# Patient Record
Sex: FEMALE | Race: OTHER
[De-identification: names, ages, dates, MRNs, and addresses within clinical notes are randomized per-mention and may not be internally consistent; named-entity substitution may affect disease eponyms.]

---

## 2017-01-25 ENCOUNTER — HOSPITAL ENCOUNTER (OUTPATIENT)
Dept: HOSPITAL 62 - OD | Age: 72
End: 2017-01-25
Attending: INTERNAL MEDICINE
Payer: MEDICARE

## 2017-01-25 ENCOUNTER — HOSPITAL ENCOUNTER (OUTPATIENT)
Dept: HOSPITAL 62 - WI | Age: 72
End: 2017-01-25
Attending: INTERNAL MEDICINE
Payer: MEDICARE

## 2017-01-25 DIAGNOSIS — M81.0: Primary | ICD-10-CM

## 2017-01-25 DIAGNOSIS — M25.512: Primary | ICD-10-CM

## 2017-01-25 PROCEDURE — 77080 DXA BONE DENSITY AXIAL: CPT

## 2017-01-25 NOTE — WOMENS IMAGING REPORT
EXAM DESCRIPTION:  BONE DENSITY HIP/SPINE



COMPLETED DATE/TIME:  1/25/2017 1:08 pm



REASON FOR STUDY:  M81.0 OSTEO M81.0  AGE-RELATED OSTEOPOROSIS W/O CURRENT PATHOLOGICAL FRAC



COMPARISON:   PET-CT 7/17/2016

Bone density 11/17/2014



TECHNIQUE:  Dual-Energy X-ray Absorptiometry (DEXA) of the AP Spine and Hip.



LIMITATIONS:  None.



FINDINGS:  LUMBAR SPINE:

The bone mineral density (BMD) measured from L1-L4 in the AP projection correlates with a T-score of 
-3.0, which is osteoporotic as defined by the World Health Organization.  This represents a 10% incre
ase in bone density compared to 2014

HIP:

The bone mineral density (BMD) measured in the left total hip correlates with a T-score of -2.4, whic
h is borderline osteoporotic as defined by the World Health Organization.  This represents a 10% incr
ease in bone density compared to 2014



COMMENT:  The World Health Organization defines low BMD as follows:

T-score:

Normal:  Greater than -1.0

Osteopenia: Between -1.0 and -2.5

Osteoporosis:  Less than -2.5 without fractures

Established osteoporosis:  Less than -2.5 with fractures

In general, you may wish to consider:

Diagnosis          Treatment                     Follow-up DEXA

Normal BMD      Prevention                    2-3 years

Osteopenia       Prevention/Therapy        1-2 years

Osteoporosis     Therapy                        Yearly



TECHNICAL DOCUMENTATION:  JOB ID:  [id number]

 [employer]- All Rights Reserved

## 2017-01-29 ENCOUNTER — HOSPITAL ENCOUNTER (OUTPATIENT)
Dept: HOSPITAL 62 - RAD | Age: 72
End: 2017-01-29
Attending: INTERNAL MEDICINE
Payer: MEDICARE

## 2017-01-29 DIAGNOSIS — C34.32: Primary | ICD-10-CM

## 2017-01-29 PROCEDURE — A9552 F18 FDG: HCPCS

## 2017-01-29 PROCEDURE — 78815 PET IMAGE W/CT SKULL-THIGH: CPT

## 2017-04-23 ENCOUNTER — HOSPITAL ENCOUNTER (OUTPATIENT)
Dept: HOSPITAL 62 - RAD | Age: 72
End: 2017-04-23
Attending: INTERNAL MEDICINE
Payer: MEDICARE

## 2017-04-23 DIAGNOSIS — C34.32: Primary | ICD-10-CM

## 2017-04-23 PROCEDURE — 78815 PET IMAGE W/CT SKULL-THIGH: CPT

## 2017-04-23 PROCEDURE — A9552 F18 FDG: HCPCS

## 2017-06-02 ENCOUNTER — HOSPITAL ENCOUNTER (OUTPATIENT)
Dept: HOSPITAL 62 - LAB | Age: 72
End: 2017-06-02
Attending: FAMILY MEDICINE
Payer: MEDICARE

## 2017-06-02 DIAGNOSIS — E87.1: Primary | ICD-10-CM

## 2017-06-02 LAB
ANION GAP SERPL CALC-SCNC: 10 MMOL/L (ref 5–19)
BUN SERPL-MCNC: 12 MG/DL (ref 7–20)
CALCIUM: 9.4 MG/DL (ref 8.4–10.2)
CHLORIDE SERPL-SCNC: 98 MMOL/L (ref 98–107)
CO2 SERPL-SCNC: 22 MMOL/L (ref 22–30)
CREAT SERPL-MCNC: 0.97 MG/DL (ref 0.52–1.25)
GLUCOSE SERPL-MCNC: 97 MG/DL (ref 75–110)
POTASSIUM SERPL-SCNC: 5 MMOL/L (ref 3.6–5)
SODIUM SERPL-SCNC: 129.5 MMOL/L (ref 137–145)

## 2017-06-02 PROCEDURE — 36415 COLL VENOUS BLD VENIPUNCTURE: CPT

## 2017-06-02 PROCEDURE — 84300 ASSAY OF URINE SODIUM: CPT

## 2017-06-02 PROCEDURE — 80048 BASIC METABOLIC PNL TOTAL CA: CPT

## 2017-06-02 PROCEDURE — 83930 ASSAY OF BLOOD OSMOLALITY: CPT

## 2017-06-02 PROCEDURE — 83935 ASSAY OF URINE OSMOLALITY: CPT

## 2017-06-05 ENCOUNTER — HOSPITAL ENCOUNTER (EMERGENCY)
Dept: HOSPITAL 62 - ER | Age: 72
Discharge: HOME | End: 2017-06-05
Payer: MEDICARE

## 2017-06-05 VITALS — SYSTOLIC BLOOD PRESSURE: 143 MMHG | DIASTOLIC BLOOD PRESSURE: 64 MMHG

## 2017-06-05 DIAGNOSIS — Z88.0: ICD-10-CM

## 2017-06-05 DIAGNOSIS — R05: ICD-10-CM

## 2017-06-05 DIAGNOSIS — R50.9: Primary | ICD-10-CM

## 2017-06-05 DIAGNOSIS — K13.79: ICD-10-CM

## 2017-06-05 DIAGNOSIS — Z79.899: ICD-10-CM

## 2017-06-05 DIAGNOSIS — I10: ICD-10-CM

## 2017-06-05 DIAGNOSIS — Z88.2: ICD-10-CM

## 2017-06-05 DIAGNOSIS — Z85.118: ICD-10-CM

## 2017-06-05 DIAGNOSIS — R59.9: ICD-10-CM

## 2017-06-05 DIAGNOSIS — E87.1: ICD-10-CM

## 2017-06-05 LAB
ALBUMIN SERPL-MCNC: 3.7 G/DL (ref 3.5–5)
ALP SERPL-CCNC: 85 U/L (ref 38–126)
ALT SERPL-CCNC: 30 U/L (ref 9–52)
ANION GAP SERPL CALC-SCNC: 9 MMOL/L (ref 5–19)
APPEARANCE UR: CLEAR
AST SERPL-CCNC: 28 U/L (ref 14–36)
BASE EXCESS BLDV CALC-SCNC: -1.7 MMOL/L
BASOPHILS # BLD AUTO: 0.1 10^3/UL (ref 0–0.2)
BASOPHILS NFR BLD AUTO: 1.2 % (ref 0–2)
BILIRUB DIRECT SERPL-MCNC: 0.4 MG/DL (ref 0–0.4)
BILIRUB SERPL-MCNC: 0.6 MG/DL (ref 0.2–1.3)
BILIRUB UR QL STRIP: NEGATIVE
BUN SERPL-MCNC: 12 MG/DL (ref 7–20)
CALCIUM: 9.4 MG/DL (ref 8.4–10.2)
CHLORIDE SERPL-SCNC: 95 MMOL/L (ref 98–107)
CO2 SERPL-SCNC: 20 MMOL/L (ref 22–30)
CREAT SERPL-MCNC: 0.99 MG/DL (ref 0.52–1.25)
EOSINOPHIL # BLD AUTO: 0.1 10^3/UL (ref 0–0.6)
EOSINOPHIL NFR BLD AUTO: 0.9 % (ref 0–6)
ERYTHROCYTE [DISTWIDTH] IN BLOOD BY AUTOMATED COUNT: 13.8 % (ref 11.5–14)
GLUCOSE SERPL-MCNC: 99 MG/DL (ref 75–110)
GLUCOSE UR STRIP-MCNC: NEGATIVE MG/DL
HCO3 BLDV-SCNC: 22 MMOL/L (ref 20–32)
HCT VFR BLD CALC: 31.8 % (ref 36–47)
HGB BLD-MCNC: 10.3 G/DL (ref 12–15.5)
HGB HCT DIFFERENCE: -0.9
KETONES UR STRIP-MCNC: NEGATIVE MG/DL
LYMPHOCYTES # BLD AUTO: 1.4 10^3/UL (ref 0.5–4.7)
LYMPHOCYTES NFR BLD AUTO: 20.1 % (ref 13–45)
MCH RBC QN AUTO: 29.1 PG (ref 27–33.4)
MCHC RBC AUTO-ENTMCNC: 32.4 G/DL (ref 32–36)
MCV RBC AUTO: 90 FL (ref 80–97)
MONOCYTES # BLD AUTO: 0.9 10^3/UL (ref 0.1–1.4)
MONOCYTES NFR BLD AUTO: 12.4 % (ref 3–13)
NEUTROPHILS # BLD AUTO: 4.6 10^3/UL (ref 1.7–8.2)
NEUTS SEG NFR BLD AUTO: 65.4 % (ref 42–78)
NITRITE UR QL STRIP: NEGATIVE
PCO2 BLDV: 33.4 MMHG (ref 35–63)
PH BLDV: 7.44 [PH] (ref 7.3–7.42)
PH UR STRIP: 6 [PH] (ref 5–9)
POTASSIUM SERPL-SCNC: 4.9 MMOL/L (ref 3.6–5)
PROT SERPL-MCNC: 6.8 G/DL (ref 6.3–8.2)
PROT UR STRIP-MCNC: NEGATIVE MG/DL
PROTHROMBIN TIME: 12.8 SEC (ref 11.4–15.4)
RBC # BLD AUTO: 3.55 10^6/UL (ref 3.72–5.28)
SODIUM SERPL-SCNC: 124.3 MMOL/L (ref 137–145)
SP GR UR STRIP: 1.01
T3FREE SERPL-MCNC: 3.08 PG/ML (ref 2.77–5.27)
TSH SERPL-ACNC: 5.73 UIU/ML (ref 0.47–4.68)
UROBILINOGEN UR-MCNC: NEGATIVE MG/DL (ref ?–2)
WBC # BLD AUTO: 7.1 10^3/UL (ref 4–10.5)

## 2017-06-05 PROCEDURE — 87086 URINE CULTURE/COLONY COUNT: CPT

## 2017-06-05 PROCEDURE — 93010 ELECTROCARDIOGRAM REPORT: CPT

## 2017-06-05 PROCEDURE — 87040 BLOOD CULTURE FOR BACTERIA: CPT

## 2017-06-05 PROCEDURE — 84443 ASSAY THYROID STIM HORMONE: CPT

## 2017-06-05 PROCEDURE — 83605 ASSAY OF LACTIC ACID: CPT

## 2017-06-05 PROCEDURE — 81001 URINALYSIS AUTO W/SCOPE: CPT

## 2017-06-05 PROCEDURE — 84439 ASSAY OF FREE THYROXINE: CPT

## 2017-06-05 PROCEDURE — 80053 COMPREHEN METABOLIC PANEL: CPT

## 2017-06-05 PROCEDURE — 93005 ELECTROCARDIOGRAM TRACING: CPT

## 2017-06-05 PROCEDURE — 85025 COMPLETE CBC W/AUTO DIFF WBC: CPT

## 2017-06-05 PROCEDURE — 82803 BLOOD GASES ANY COMBINATION: CPT

## 2017-06-05 PROCEDURE — 85610 PROTHROMBIN TIME: CPT

## 2017-06-05 PROCEDURE — 84481 FREE ASSAY (FT-3): CPT

## 2017-06-05 PROCEDURE — 99284 EMERGENCY DEPT VISIT MOD MDM: CPT

## 2017-06-05 PROCEDURE — 36415 COLL VENOUS BLD VENIPUNCTURE: CPT

## 2017-06-05 PROCEDURE — 71020: CPT

## 2017-06-05 NOTE — ER DOCUMENT REPORT
ED General





- General


Stated Complaint: WEAKNESS


Time Seen by Provider: 06/05/17 18:48


Mode of Arrival: Ambulatory


Information source: Patient, Relative


Notes: 


71-year-old female who is currently on her eighth round of immunotherapy for 

enlarged lymph node with a history of lung CA presents with complaint of fever.

  Patient denies any complaints at all.  Patient's oncologist requested lab work


TRAVEL OUTSIDE OF THE [place] IN LAST 30 DAYS: No





- HPI


Onset: Yesterday


Onset/Duration: Sudden


Quality of pain: No pain


Severity: None


Pain Level: Denies


Associated symptoms: Fever


Exacerbated by: Denies


Relieved by: Denies


Similar symptoms previously: Yes


Recently seen / treated by doctor: Yes





- Related Data


Allergies/Adverse Reactions: 


 





Penicillins Allergy (Unknown, Verified 10/18/13 12:21)


 


Sulfa (Sulfonamide Antibiotics) Allergy (Unknown, Verified 10/18/13 12:21)


 











Past Medical History





- Social History


Smoking Status: Never Smoker


Cigarette use (# per day): No


Chew tobacco use (# tins/day): No


Smoking Education Provided: No


Family History: Reviewed & Not Pertinent





- Past Medical History


Cardiac Medical History: Reports: Hx Hypertension


   Denies: Hx Heart Attack


Pulmonary Medical History: 


   Denies: Hx Asthma, Hx Tuberculosis


Neurological Medical History: Denies: Hx Cerebrovascular Accident, Hx Seizures


GI Medical History: Denies: Hx Hepatitis, Hx Hiatal Hernia, Hx Ulcer


Infectious Medical History: Denies: Hx Hepatitis


Past Surgical History: Denies: Hx Mastectomy, Hx Open Heart Surgery, Hx 

Pacemaker





- Immunizations


Hx Diphtheria, Pertussis, Tetanus Vaccination: No





Review of Systems





- Review of Systems


Notes: 


REVIEW OF SYSTEMS:


CONSTITUTIONAL : admit to fever


EENT:   Mouth pain


CARDIOVASCULAR:  Denies chest pain.  Denies palpitations or racing or irregular 

heart beat.  Denies ankle edema.


RESPIRATORY:  Denies cough, cold, or chest congestion.  Denies shortness of 

breath, difficulty breathing, or wheezing.


GASTROINTESTINAL:  Denies abdominal pain or distention.  Denies nausea, vomiting

, or diarrhea.  Denies blood in vomitus, stools, or per rectum.  Denies black, 

tarry stools.  Denies constipation. 


GENITOURINARY:  Denies difficulty urinating, painful urination, burning, 

frequency, blood in urine, or discharge.


FEMALE  GENITOURINARY:  Denies vaginal bleeding, heavy or abnormal periods, 

irregular periods.  Denies vaginal discharge or odor. 


MUSCULOSKELETAL:  Denies back or neck pain or stiffness.  Denies joint pain or 

swelling.


SKIN:   Denies rash, lesions or sores.


HEMATOLOGIC :   Denies easy bruising or bleeding.


LYMPHATIC:  Denies swollen, enlarged glands.


NEUROLOGICAL:  Denies confusion or altered mental status.  Denies passing out 

or loss of consciousness.  Denies dizziness or lightheadedness.  Denies 

headache.  Denies weakness or paralysis or loss of use of either side.  Denies 

problems with gait or speech.  Denies sensory loss, numbness, or tingling.  

Denies seizures.


PSYCHIATRIC:  Denies anxiety or stress.  Denies depression, suicidal ideation, 

or homicidal ideation.





ALL OTHER SYSTEMS REVIEWED AND NEGATIVE.








Dictation was performed using Dragon voice recognition software








PHYSICAL EXAMINATION:





GENERAL: Well-appearing, well-nourished and in no acute distress.





HEAD: Atraumatic, normocephalic.





EYES: Pupils equal round and reactive to light, extraocular movements intact, 

conjunctiva are normal.





ENT: Nares patent, oropharynx clear without exudates.  Moist mucous membranes.  

Sores on tongue





NECK: Normal range of motion, supple without lymphadenopathy





LUNGS: Breath sounds clear to auscultation bilaterally and equal.  No wheezes 

rales or rhonchi.





HEART: Regular rate and rhythm without murmurs





ABDOMEN: Soft, nontender, nondistended abdomen.  No guarding, no rebound.  No 

masses appreciated.





Female : deferred





Musculoskeletal: Normal range of motion, no pitting or edema.  No cyanosis.





NEUROLOGICAL: Cranial nerves grossly intact.  Normal speech, normal gait.  

Normal sensory, motor exams





PSYCH: Normal mood, normal affect.





SKIN: Warm, Dry, normal turgor, no rashes or lesions noted.





Physical Exam





- Vital signs


Vitals: 


 











Temp


 


 98.8 F 


 


 06/05/17 20:29














Course





- Re-evaluation


Re-evalutation: 


06/05/17 18:50


Labwork is pending patient otherwise she looks extremely well given history of 

immuno compromised state cultures are pending





06/05/17 20:56


Mild hyponatremia is noted, patient otherwise looks well, I will defer on any 

further treatment at family request.


have no suspicion for sepsis











After performing a Medical Screening Examination, I estimate there is LOW risk 

for ACUTE CORONARY SYNDROME, RESPIRATORY FAILURE, SEPSIS OR MENINGITIS, thus I 

consider the discharge disposition reasonable.  I have reevaluated this patient 

multiple times and no significant life threatening changes are noted. The 

patient son and I have discussed the diagnosis and risks, and we agree with 

discharging home with close follow-up. We also discussed returning to the 

Emergency Department immediately if new or worsening symptoms occur. We have 

discussed the symptoms which are most concerning (e.g., changing or worsening 

pain, trouble swallowing or breathing, neck stiffness, fever) that necessitate 

immediate return.





- Vital Signs


Vital signs: 


 











Temp Pulse Resp BP Pulse Ox


 


 98.8 F   83   18   149/60 H  100 


 


 06/05/17 20:30  06/05/17 20:31  06/05/17 20:31  06/05/17 20:31  06/05/17 20:31














- Laboratory


Result Diagrams: 


 06/05/17 19:55





 06/05/17 20:05


Laboratory results interpreted by me: 


 











  06/05/17 06/05/17 06/05/17





  19:05 19:55 20:05


 


RBC   3.55 L 


 


Hgb   10.3 L 


 


Hct   31.8 L 


 


VBG pH   


 


VBG pCO2   


 


Sodium    124.3 L


 


Chloride    95 L


 


Carbon Dioxide    20 L


 


Est GFR (Non-Af Amer)    55 L


 


Urine Ascorbic Acid  40 H  














  06/05/17





  20:05


 


RBC 


 


Hgb 


 


Hct 


 


VBG pH  7.44 H


 


VBG pCO2  33.4 L


 


Sodium 


 


Chloride 


 


Carbon Dioxide 


 


Est GFR (Non-Af Amer) 


 


Urine Ascorbic Acid 














- Diagnostic Test


Radiology reviewed: Image reviewed, Reports reviewed - questionble infiltrate





- EKG Interpretation by Me


EKG shows normal: Sinus rhythm, Axis, Intervals, QRS Complexes





Discharge





- Discharge


Clinical Impression: 


 Cough, Hyponatremia





Fever


Qualifiers:


 Fever type: unspecified Qualified Code(s): R50.9 - Fever, unspecified





Condition: Stable


Disposition: HOME, SELF-CARE


Instructions:  Fever ([facility])


Referrals: 


KUNAL BYRD MD [Primary Care Provider] - Follow up tomorrow

## 2017-06-05 NOTE — RADIOLOGY REPORT (SQ)
EXAM DESCRIPTION:  CHEST PA/LAT



COMPLETED DATE/TIME:  6/5/2017 7:10 pm



REASON FOR STUDY:  fever



COMPARISON:  PET scan dated April 2017



EXAM PARAMETERS:  NUMBER OF VIEWS: two views

TECHNIQUE: Digital Frontal and Lateral radiographic views of the chest acquired.

RADIATION DOSE: NA

LIMITATIONS: none



FINDINGS:  LUNGS AND PLEURA: There are patchy pleural and parenchymal densities in the left mid and l
ower lung field which appear improved as compared to the previous PET scan.  These could represent re
sidual changes related to the mass lesions described on the previous study.  The possibility of an ac
[place] process cannot be excluded.  There is some ill-defined increased density in the right lung apex w
hich could represent a developing infiltrate.

MEDIASTINUM AND HILAR STRUCTURES: Left hilum is partially obscured due to overlying densities.

HEART AND VASCULAR STRUCTURES: Heart normal size.  No evidence for failure.

BONES: No acute findings.

HARDWARE: Surgical clips are identified in left lower hemithorax and in the right lung apex.

OTHER: No other significant finding.



IMPRESSION:  There are patchy pleuroparenchymal changes in the left mid and lower lung field as noted
 above which appear improved as compared to the previous PET-CT scan.  This could represent residual 
changes related to the mass lesions described on the previous study.  The possibility of an acute pro
cess cannot be excluded.  There is some ill-defined increased density in the right lung apex which co
uld represent a developing infiltrate.  Other findings as noted above



TECHNICAL DOCUMENTATION:  JOB ID:  [id number]

 [employer]- All Rights Reserved

## 2017-06-05 NOTE — EKG REPORT
SEVERITY:- BORDERLINE ECG -

SINUS ARRHYTHMIA, RATE 70-94

PROBABLE LEFT ATRIAL ABNORMALITY

LOW VOLTAGE IN FRONTAL LEADS

:

Confirmed by: Pushpa Echeverria 05-Jun-2017 21:15:03

## 2017-06-07 ENCOUNTER — HOSPITAL ENCOUNTER (OUTPATIENT)
Dept: HOSPITAL 62 - LAB | Age: 72
End: 2017-06-07
Attending: PHYSICIAN ASSISTANT
Payer: MEDICARE

## 2017-06-07 DIAGNOSIS — E87.1: Primary | ICD-10-CM

## 2017-06-07 LAB
ANION GAP SERPL CALC-SCNC: 10 MMOL/L (ref 5–19)
BUN SERPL-MCNC: 11 MG/DL (ref 7–20)
CALCIUM: 9.4 MG/DL (ref 8.4–10.2)
CHLORIDE SERPL-SCNC: 96 MMOL/L (ref 98–107)
CO2 SERPL-SCNC: 23 MMOL/L (ref 22–30)
CREAT SERPL-MCNC: 1.09 MG/DL (ref 0.52–1.25)
GLUCOSE SERPL-MCNC: 102 MG/DL (ref 75–110)
POTASSIUM SERPL-SCNC: 4.9 MMOL/L (ref 3.6–5)
SODIUM SERPL-SCNC: 128.9 MMOL/L (ref 137–145)

## 2017-06-07 PROCEDURE — 36415 COLL VENOUS BLD VENIPUNCTURE: CPT

## 2017-06-07 PROCEDURE — 80048 BASIC METABOLIC PNL TOTAL CA: CPT

## 2017-06-12 ENCOUNTER — HOSPITAL ENCOUNTER (OUTPATIENT)
Dept: HOSPITAL 62 - LAB | Age: 72
End: 2017-06-12
Attending: PHYSICIAN ASSISTANT
Payer: MEDICARE

## 2017-06-12 DIAGNOSIS — E87.1: Primary | ICD-10-CM

## 2017-06-12 LAB
ANION GAP SERPL CALC-SCNC: 13 MMOL/L (ref 5–19)
BUN SERPL-MCNC: 10 MG/DL (ref 7–20)
CALCIUM: 8.7 MG/DL (ref 8.4–10.2)
CHLORIDE SERPL-SCNC: 100 MMOL/L (ref 98–107)
CO2 SERPL-SCNC: 20 MMOL/L (ref 22–30)
CREAT SERPL-MCNC: 0.94 MG/DL (ref 0.52–1.25)
GLUCOSE SERPL-MCNC: 92 MG/DL (ref 75–110)
POTASSIUM SERPL-SCNC: 4.7 MMOL/L (ref 3.6–5)
SODIUM SERPL-SCNC: 132.5 MMOL/L (ref 137–145)

## 2017-06-12 PROCEDURE — 36415 COLL VENOUS BLD VENIPUNCTURE: CPT

## 2017-06-12 PROCEDURE — 80048 BASIC METABOLIC PNL TOTAL CA: CPT

## 2017-07-11 ENCOUNTER — HOSPITAL ENCOUNTER (OUTPATIENT)
Dept: HOSPITAL 62 - II | Age: 72
Discharge: HOME | End: 2017-07-11
Attending: INTERNAL MEDICINE
Payer: MEDICARE

## 2017-07-11 VITALS — SYSTOLIC BLOOD PRESSURE: 140 MMHG | DIASTOLIC BLOOD PRESSURE: 47 MMHG

## 2017-07-11 DIAGNOSIS — C34.32: Primary | ICD-10-CM

## 2017-07-11 PROCEDURE — 3E033GC INTRODUCTION OF OTHER THERAPEUTIC SUBSTANCE INTO PERIPHERAL VEIN, PERCUTANEOUS APPROACH: ICD-10-PCS | Performed by: INTERNAL MEDICINE

## 2017-07-11 PROCEDURE — 96365 THER/PROPH/DIAG IV INF INIT: CPT

## 2017-07-30 ENCOUNTER — HOSPITAL ENCOUNTER (OUTPATIENT)
Dept: HOSPITAL 62 - RAD | Age: 72
End: 2017-07-30
Attending: INTERNAL MEDICINE
Payer: MEDICARE

## 2017-07-30 DIAGNOSIS — C34.32: Primary | ICD-10-CM

## 2017-07-30 PROCEDURE — A9552 F18 FDG: HCPCS

## 2017-07-30 PROCEDURE — 78815 PET IMAGE W/CT SKULL-THIGH: CPT

## 2017-07-31 NOTE — RADIOLOGY REPORT (SQ)
EXAM DESCRIPTION:  PET CT SKULL/THIGH



COMPLETED DATE/TIME:  7/30/2017 7:13 pm



REASON FOR STUDY:  LUNG CANCER C34.32  MALIGNANT NEOPLASM OF LOWER LOBE, LEFT BRONCHUS OR TJ



COMPARISON:  4/23/2017 and 1/29/2017.



RADIONUCLIDE AND DOSE:  12.0 mCi F18 FDG

The route of agent administration: Intravenous



FASTING BLOOD SUGAR:  74 mg/dl



CONTRAST TYPE AND DOSE:  No CT contrast given.



TECHNIQUE:  Blood glucose level was verified.  Above dose of FDG was injected intravenously.  2-D seg
mented attenuation correction images were obtained from the base of the skull to the midthighs.  Nonc
ontrast CT images were obtained for attenuation correction and fusion with emission images.  CT image
s were performed without oral or intravenous contrast and are not sensitive for parenchymal lesions. 
 A series of overlapping emission PET images were obtained.  Images reviewed and manipulated at [facility] work station by the radiologist.  Images stored on PACS.



LIMITATIONS:  None.



FINDINGS:  HEAD AND NECK: There is a new hypermetabolic cervical lymph node on the left side.  This m
easures 7 mm (image 28) with mean SUV value 3.38 and maximum value 4.65.  Additional smaller subcenti
meter lymph nodes bilaterally at roughly the same level with mean SUV value on the right 2.92 and on 
the left 3.36.  There is also a focal area of increased activity on the right side of the thyroid.  O
n CT images difficult to determine if this is the thyroid or could be a small adjacent lymph node.  M
taj SUV value 3.21.

CHEST: The mass in the left lower lobe is smaller, currently measuring 2.4 x 3.3 cm with prior measur
ement of 3.1 x 3.5 cm.  Mean SUV value 3.21 with prior value of 3.  Subcarinal lymph node measures 0.
8 x 1.2 cm with prior measurement of 1.5 x 1.7 cm.  Mean SUV value 4.52 with prior value of 6.5.  The
re are small bilateral hilar lymph nodes, subcentimeter, with mean SUV value on the right 3.04 and on
 the left 3.50.  Previously seen right supraclavicular lymph node is no longer present.  There are ra
diotherapy markers at this level.  Previously seen right paratracheal lymph node with no measurable m
etabolic activity.

ABDOMEN AND PELVIS: No areas of abnormal metabolic activity in the abdomen or pelvis.  Expected physi
ologic activity is present in the genitourinary system and bowel.

PROXIMAL LOWER EXTREMITIES: No areas of abnormal metabolic activity in the soft tissues of the lower 
extremities.

BONES: No abnormal metabolic activity in the visualized skeleton.

ADDITIONAL CT FINDINGS: Hiatal hernia.  Colonic diverticulosis.  No additional significant findings o
n the noncontrast CT images.

OTHER: No other significant findings.



IMPRESSION:

1. GENERAL IMPROVEMENT IN THE CHEST.  THE LEFT LOWER LOBE MASS AND THE SUBCARINAL LYMPH NODE HAVE BOT
H DECREASED IN SIZE WITH DECREASED METABOLIC ACTIVITY COMPARED TO THE MOST RECENT STUDY.  THE RIGHT S
UPRACLAVICULAR LYMPH NODE IS NO LONGER PRESENT.  THERE ARE NOW RADIOTHERAPY MARKERS AT THIS LEVEL.  T
HERE IS MILD HILAR ACTIVITY.

2. INTERVAL DEVELOPMENT OF SMALL SUBCENTIMETER CERVICAL LYMPH NODES WITH INCREASED METABOLIC ACTIVITY
.  THESE COULD REPRESENT METASTATIC LESIONS ALTHOUGH OTHER ETIOLOGIES INCLUDE INFLAMMATION OR INFECTI
ON.



TECHNICAL DOCUMENTATION:  JOB ID:  [id number]

 2011 [employer]- All Rights Reserved

## 2017-10-22 ENCOUNTER — HOSPITAL ENCOUNTER (OUTPATIENT)
Dept: HOSPITAL 62 - RAD | Age: 72
End: 2017-10-22
Attending: INTERNAL MEDICINE
Payer: MEDICARE

## 2017-10-22 DIAGNOSIS — C34.32: Primary | ICD-10-CM

## 2017-10-22 PROCEDURE — A9552 F18 FDG: HCPCS

## 2017-10-22 PROCEDURE — 78815 PET IMAGE W/CT SKULL-THIGH: CPT

## 2017-10-23 NOTE — RADIOLOGY REPORT (SQ)
EXAM DESCRIPTION:  PET CT SKULL/THIGH



COMPLETED DATE/TIME:  10/22/2017 5:18 pm



REASON FOR STUDY:  LUNG CANCER C34.32  MALIGNANT NEOPLASM OF LOWER LOBE, LEFT BRONCHUS OR TJ



COMPARISON:  Multiple previous, 7/30/2017, 4/23/2017, 1/29/2017, 8/9/2013 PET-CT



RADIONUCLIDE AND DOSE:  10.2 mCi F18 FDG

The route of agent administration: Intravenous



FASTING BLOOD SUGAR:  79 mg/dl



CONTRAST TYPE AND DOSE:  No CT contrast given.



TECHNIQUE:  Blood glucose level was verified.  Above dose of FDG was injected intravenously.  2-D seg
mented attenuation correction images were obtained from the base of the skull to the midthighs.  Nonc
ontrast CT images were obtained for attenuation correction and fusion with emission images.  CT image
s were performed without oral or intravenous contrast and are not sensitive for parenchymal lesions. 
 A series of overlapping emission PET images were obtained.  Images reviewed and manipulated at indep
[facility] by the radiologist.  Images stored on PACS.



LIMITATIONS:  None.



FINDINGS:  There is diffuse brown fat activity throughout the supraclavicular and paraspinal regions.
  Most activity areas of brown fat metabolism are along the right posterior rib interspace between th
e 6th and 7th ribs, with SUV 2.9.  Prompt fat metabolism is also seen in the left lower paraspinal re
gion between the left posterior 9th and 10th ribs, with SUV 3.4.

HEAD AND NECK: Hypermetabolic cervical nodes seen on 7/30/2017 have resolved.

CHEST: Persistent soft tissue mass in the left lower lobe with adjacent radiotherapy treatment marker
s, 3.5 x 2.5 cm in size with SUV 2.8 (was stable in size since 7/30/2017, SUV at that time 3.2).

There is a sub- carinal lymph node stable in size, 1.2 x 0.7 cm in size, with SUV 3.7 (SUV 7/30/2017 
4.5).

A subcentimeter right hilar lymph node is present on axial image 73 with SUV 2.4  (was SUV 3.01 7/30/
2017).

The left hilar lymph node difficult to measure in size, about a cm on axial image 74 has SUV today at
 2.5 (was 3.5 on 7/30/2017).

ABDOMEN AND PELVIS: No areas of abnormal metabolic activity in the abdomen or pelvis.  Expected physi
ologic activity is present in the genitourinary system and bowel.

PROXIMAL LOWER EXTREMITIES: No areas of abnormal metabolic activity in the soft tissues of the lower 
extremities.

BONES: No abnormal metabolic activity in the visualized skeleton.

ADDITIONAL CT FINDINGS: Moderate-sized retrocardiac hiatal hernia, colonic diverticulosis without CT 
signs of acute diverticulitis.

OTHER: Liver background SUV 1.8.  Blood pool background activity 1.6 SUV



IMPRESSION:  Continued favorable response, with low level metabolic activity in the primary left lowe
r lobe tumor and few small mediastinal lymph nodes.

Diffuse brown fat activity.



TECHNICAL DOCUMENTATION:  JOB ID:  [id number]

 2011 [employer]- All Rights Reserved

## 2018-02-25 ENCOUNTER — HOSPITAL ENCOUNTER (OUTPATIENT)
Dept: HOSPITAL 62 - RAD | Age: 73
End: 2018-02-25
Attending: INTERNAL MEDICINE
Payer: MEDICARE

## 2018-02-25 DIAGNOSIS — C34.32: Primary | ICD-10-CM

## 2018-02-25 PROCEDURE — 78815 PET IMAGE W/CT SKULL-THIGH: CPT

## 2018-02-25 PROCEDURE — A9552 F18 FDG: HCPCS

## 2018-02-26 NOTE — RADIOLOGY REPORT (SQ)
EXAM DESCRIPTION:  PET CT SKULL/THIGH



COMPLETED DATE/TIME:  2/25/2018 6:28 pm



REASON FOR STUDY:  LUNG CANCER C34.32  MALIGNANT NEOPLASM OF LOWER LOBE, LEFT BRONCHUS OR TJ



COMPARISON:  7/30/2017, 7/17/2016, 5/24/2015, 11/9/2014, 8/9/2013 PET-CT exams



RADIONUCLIDE AND DOSE:  10.1 mCi F18 FDG

The route of agent administration: Intravenous



FASTING BLOOD SUGAR:  71 mg/dl



CONTRAST TYPE AND DOSE:  No CT contrast given.



TECHNIQUE:  Blood glucose level was verified.  Above dose of FDG was injected intravenously.  2-D seg
mented attenuation correction images were obtained from the base of the skull to the midthighs.  Nonc
ontrast CT images were obtained for attenuation correction and fusion with emission images.  CT image
s were performed without oral or intravenous contrast and are not sensitive for parenchymal lesions. 
 A series of overlapping emission PET images were obtained.  Images reviewed and manipulated at indep
[facility] work station by the radiologist.  Images stored on PACS.



LIMITATIONS:  None.



FINDINGS:  HEAD AND NECK: Hypermetabolic cervical nodes described on 7/30/2017 have resolved.  Just d
orsal to the right permanent central line, a subcentimeter supraclavicular lymph node is present on a
xial image 41 with SUV 2.5.  This is of doubtful clinical significance.

CHEST: The primary mass in the left lower lobe surrounded by radiotherapy treatment markers is stable
 in size, about 3.3 x 2.5 cm in diameter with SUV 3.7 (was 3.3 x 2.4 cm in size with SUV 3.21 7/30/20
17).

There is a sub-carinal lymph node 1.1 x 0.6 cm in size with SUV of 4 (was 1.2 x 0.8 cm with SUV 4.5 o
n 7/30/2017).

ABDOMEN AND PELVIS: No areas of abnormal metabolic activity in the abdomen or pelvis.  Expected physi
ologic activity is present in the genitourinary system and bowel.

PROXIMAL LOWER EXTREMITIES: No areas of abnormal metabolic activity in the soft tissues of the lower 
extremities.

BONES: Small non metabolic sclerotic foci are present in the left C7 pedicle, left T1 lamina and brig
ht T2 lamina unchanged since 2013.

ADDITIONAL CT FINDINGS: Large retrocardiac hiatal hernia.  Calcified granuloma right posterior lung b
ase.

OTHER: Liver background activity 2.65 SUV.  Blood pool activity 2.1 SUV



IMPRESSION:  Stable left lower lobe mass and sub- carinal lymph node compared to multiple previous st
udies.

Resolved cervical adenopathy seen on 7/30/2017



TECHNICAL DOCUMENTATION:  JOB ID:  [id number]

 2011 [employer]- All Rights Reserved



Reading location - IP/workstation name: [email or web address]

## 2018-03-06 ENCOUNTER — HOSPITAL ENCOUNTER (OUTPATIENT)
Dept: HOSPITAL 62 - OD | Age: 73
End: 2018-03-06
Attending: PHYSICIAN ASSISTANT
Payer: MEDICARE

## 2018-03-06 DIAGNOSIS — E03.9: Primary | ICD-10-CM

## 2018-03-06 LAB
FREE T4 (FREE THYROXINE): 0.64 NG/DL (ref 0.78–2.19)
FREE T4 (FREE THYROXINE): 0.64 NG/DL (ref 0.78–2.19)
T3FREE SERPL-MCNC: 1.77 PG/ML (ref 2.77–5.27)
TSH SERPL-ACNC: 76.7 UIU/ML (ref 0.47–4.68)
TSH SERPL-ACNC: 76.7 UIU/ML (ref 0.47–4.68)

## 2018-03-06 PROCEDURE — 84481 FREE ASSAY (FT-3): CPT

## 2018-03-06 PROCEDURE — 84439 ASSAY OF FREE THYROXINE: CPT

## 2018-03-06 PROCEDURE — 84443 ASSAY THYROID STIM HORMONE: CPT

## 2018-03-06 PROCEDURE — 36415 COLL VENOUS BLD VENIPUNCTURE: CPT

## 2018-05-01 ENCOUNTER — HOSPITAL ENCOUNTER (OUTPATIENT)
Dept: HOSPITAL 62 - OD | Age: 73
End: 2018-05-01
Attending: PHYSICIAN ASSISTANT
Payer: MEDICARE

## 2018-05-01 DIAGNOSIS — E03.9: Primary | ICD-10-CM

## 2018-05-01 LAB
FREE T4 (FREE THYROXINE): 1.47 NG/DL (ref 0.78–2.19)
TSH SERPL-ACNC: 3.68 UIU/ML (ref 0.47–4.68)

## 2018-05-01 PROCEDURE — 36415 COLL VENOUS BLD VENIPUNCTURE: CPT

## 2018-05-01 PROCEDURE — 84443 ASSAY THYROID STIM HORMONE: CPT

## 2018-05-01 PROCEDURE — 84439 ASSAY OF FREE THYROXINE: CPT

## 2018-06-24 ENCOUNTER — HOSPITAL ENCOUNTER (OUTPATIENT)
Dept: HOSPITAL 62 - RAD | Age: 73
End: 2018-06-24
Attending: INTERNAL MEDICINE
Payer: MEDICARE

## 2018-06-24 DIAGNOSIS — C34.32: Primary | ICD-10-CM

## 2018-06-24 PROCEDURE — A9552 F18 FDG: HCPCS

## 2018-06-24 PROCEDURE — 78815 PET IMAGE W/CT SKULL-THIGH: CPT

## 2018-06-25 NOTE — RADIOLOGY REPORT (SQ)
EXAM DESCRIPTION:  PET CT SKULL/THIGH



COMPLETED DATE/TIME:  6/24/2018 8:27 pm



REASON FOR STUDY:  LUNG CANCER C34.32  MALIGNANT NEOPLASM OF LOWER LOBE, LEFT BRONCHUS OR TJ



COMPARISON:  2/25/2018 and 7/30/2017.



RADIONUCLIDE AND DOSE:  10.0 mCi F18 FDG

The route of agent administration: Intravenous



FASTING BLOOD SUGAR:  93 mg/dl



CONTRAST TYPE AND DOSE:  No CT contrast given.



TECHNIQUE:  Blood glucose level was verified.  Above dose of FDG was injected intravenously.  2-D seg
mented attenuation correction images were obtained from the base of the skull to the midthighs.  Nonc
ontrast CT images were obtained for attenuation correction and fusion with emission images.  CT image
s were performed without oral or intravenous contrast and are not sensitive for parenchymal lesions. 
 A series of overlapping emission PET images were obtained.  Images reviewed and manipulated at [facility] work station by the radiologist.  Images stored on PACS.



LIMITATIONS:  None.



FINDINGS:  HEAD AND NECK: There is prominent focal increased activity in the posterior left vocal cor
d at the attachment to the arytenoid.  Mean SUV 7.54.  No abnormal finding on accompanying CT image. 
 The right supraclavicular lymph node is again identified but currently has no abnormal increased act
ivity.  No other areas of abnormal metabolic activity in the soft tissues of the head and neck.

CHEST: The primary mass in the left lower lobe currently has maximum transverse measurements of 1.8 x
 3.9 cm.  (Axial series 3, image 98).  Prior measurements at this same level were 1.3 x 3.9 cm.  (Axi
al series 3, image 86).  Mean SUV value on the current study 3.87 and 4.39.  Prior values were 3.66 a
nd 3.74.  The previous seen subcarinal lymph node with calcification measures 0.5 x 1.2 cm with prior
 measurement 0.6 x 1.1 cm.  Mean SUV value 4.62 with prior value of 4.  Again seen is calcified granu
carola in the right lower lobe.  Subcentimeter nodule in the left lower lobe (axial series 3, image 112
) is unchanged and is not hypermetabolic.

ABDOMEN AND PELVIS: No areas of abnormal metabolic activity in the abdomen or pelvis.  Expected physi
ologic activity is present in the genitourinary system and bowel.

PROXIMAL LOWER EXTREMITIES: No areas of abnormal metabolic activity in the soft tissues of the lower 
extremities.

BONES: No abnormal metabolic activity in the visualized skeleton.

ADDITIONAL CT FINDINGS: Hiatal hernia.  No additional significant findings on the noncontrast CT imag
es.

OTHER: Liver background activity SUV 2.62.  Blood pool background activity mean SUV 1.79.



IMPRESSION:

1. PROMINENT FOCAL INCREASED ACTIVITY IN THE POSTERIOR LEFT VOCAL CORD AS DESCRIBED.  NO ASSOCIATED F
INDING ON CT.  THIS IS PRESUMED RELATED TO SPEAKING.  UNUSUAL THAT THE ACTIVITY IS ASYMMETRIC AND SOLITARIO
S NOT INCLUDE THE RIGHT VOCAL CORD.  THIS COULD BE INDICATIVE OF RIGHT VOCAL CORD PARALYSIS.  RECOMME
ND CLINICAL EVALUATION.

2. PRIMARY MASS IN THE LEFT LOWER LOBE IS SLIGHTLY LARGER AND DOES HAVE INCREASED SUV VALUE ON THE CU
RRENT STUDY COMPARED TO THE PRIOR STUDY.  SUBCARINAL LYMPH NODE IS UNCHANGED IN SIZE WITH MEAN SUV VA
LUE ALSO SLIGHTLY HIGHER.  THE RIGHT SUPRACLAVICULAR LYMPH NODE IS UNCHANGED ON CT WITH NO ABNORMAL A
CTIVITY ON PET IMAGING.  NO NEW FINDINGS IN THE CHEST OR ELSEWHERE.



TECHNICAL DOCUMENTATION:  JOB ID:  [id number]

 2011 [employer]- All Rights Reserved



Reading location - IP/workstation name: [facility]

## 2018-09-30 ENCOUNTER — HOSPITAL ENCOUNTER (OUTPATIENT)
Dept: HOSPITAL 62 - RAD | Age: 73
End: 2018-09-30
Attending: INTERNAL MEDICINE
Payer: MEDICARE

## 2018-09-30 DIAGNOSIS — C34.32: Primary | ICD-10-CM

## 2018-09-30 PROCEDURE — 78815 PET IMAGE W/CT SKULL-THIGH: CPT

## 2018-09-30 PROCEDURE — A9552 F18 FDG: HCPCS

## 2018-10-01 NOTE — RADIOLOGY REPORT (SQ)
EXAM DESCRIPTION:  PET CT SKULL/THIGH



COMPLETED DATE/TIME:  9/30/2018 7:29 pm



REASON FOR STUDY:  LUNG CANCER C34.32  MALIGNANT NEOPLASM OF LOWER LOBE, LEFT BRONCHUS OR TJ



COMPARISON:  6/24/2018



RADIONUCLIDE AND DOSE:  10.98 mCi F18 FDG

The route of agent administration: Intravenous



FASTING BLOOD SUGAR:  84 mg/dl



CONTRAST TYPE AND DOSE:  No CT contrast given.



TECHNIQUE:  Blood glucose level was verified.  Above dose of FDG was injected intravenously.  2-D seg
mented attenuation correction images were obtained from the base of the skull to the midthighs.  Nonc
ontrast CT images were obtained for attenuation correction and fusion with emission images.  CT image
s were performed without oral or intravenous contrast and are not sensitive for parenchymal lesions. 
 A series of overlapping emission PET images were obtained.  Images reviewed and manipulated at [facility] work station by the radiologist.  Images stored on PACS.



LIMITATIONS:  None.



FINDINGS:  HEAD AND NECK: No areas of significant abnormal metabolic activity in the soft tissues of 
the head and neck.

CHEST: Left lower lobe lesion 1.7 x 4.1 cm with SUV 4.7 to 5.4, previously 3.9 to 4.4.  Station 7 par
tially calcified lymph node with no change morphologically or SUV values.

ABDOMEN AND PELVIS: No areas of abnormal metabolic activity in the abdomen or pelvis.  Expected physi
ologic activity is present in the genitourinary system and bowel.

PROXIMAL LOWER EXTREMITIES: No areas of abnormal metabolic activity in the soft tissues of the lower 
extremities.

BONES: No abnormal metabolic activity in the visualized skeleton.

ADDITIONAL CT FINDINGS: No additional significant findings on the noncontrast CT images.

OTHER: No other significant findings.



IMPRESSION:  Stable exam.  Primary lung lesion morphologically unchanged with slight change in SUVs o
f uncertain clinical significance.



TECHNICAL DOCUMENTATION:  JOB ID:  [id number]

 2011 [employer]- All Rights Reserved



Reading location - IP/workstation name: [email or web address]

## 2018-10-10 ENCOUNTER — HOSPITAL ENCOUNTER (OUTPATIENT)
Dept: HOSPITAL 62 - II | Age: 73
Discharge: HOME | End: 2018-10-10
Attending: INTERNAL MEDICINE
Payer: MEDICARE

## 2018-10-10 VITALS — DIASTOLIC BLOOD PRESSURE: 59 MMHG | SYSTOLIC BLOOD PRESSURE: 140 MMHG

## 2018-10-10 DIAGNOSIS — N18.3: ICD-10-CM

## 2018-10-10 DIAGNOSIS — D50.8: Primary | ICD-10-CM

## 2018-10-10 PROCEDURE — 3E043GC INTRODUCTION OF OTHER THERAPEUTIC SUBSTANCE INTO CENTRAL VEIN, PERCUTANEOUS APPROACH: ICD-10-PCS | Performed by: INTERNAL MEDICINE

## 2018-10-10 PROCEDURE — 96365 THER/PROPH/DIAG IV INF INIT: CPT

## 2018-10-10 PROCEDURE — 96367 TX/PROPH/DG ADDL SEQ IV INF: CPT

## 2018-10-17 ENCOUNTER — HOSPITAL ENCOUNTER (OUTPATIENT)
Dept: HOSPITAL 62 - II | Age: 73
Discharge: HOME | End: 2018-10-17
Attending: INTERNAL MEDICINE
Payer: MEDICARE

## 2018-10-17 VITALS — DIASTOLIC BLOOD PRESSURE: 54 MMHG | SYSTOLIC BLOOD PRESSURE: 141 MMHG

## 2018-10-17 DIAGNOSIS — N18.3: ICD-10-CM

## 2018-10-17 DIAGNOSIS — D50.8: Primary | ICD-10-CM

## 2018-10-17 PROCEDURE — 96365 THER/PROPH/DIAG IV INF INIT: CPT

## 2018-10-17 PROCEDURE — 3E033GC INTRODUCTION OF OTHER THERAPEUTIC SUBSTANCE INTO PERIPHERAL VEIN, PERCUTANEOUS APPROACH: ICD-10-PCS | Performed by: INTERNAL MEDICINE

## 2018-10-17 PROCEDURE — 96367 TX/PROPH/DG ADDL SEQ IV INF: CPT

## 2018-11-07 ENCOUNTER — HOSPITAL ENCOUNTER (OUTPATIENT)
Dept: HOSPITAL 62 - LAB | Age: 73
End: 2018-11-07
Attending: PHYSICIAN ASSISTANT
Payer: MEDICARE

## 2018-11-07 DIAGNOSIS — E03.9: Primary | ICD-10-CM

## 2018-11-07 LAB
FREE T4 (FREE THYROXINE): 1.62 NG/DL (ref 0.78–2.19)
TSH SERPL-ACNC: 2.98 UIU/ML (ref 0.47–4.68)

## 2018-11-07 PROCEDURE — 84443 ASSAY THYROID STIM HORMONE: CPT

## 2018-11-07 PROCEDURE — 36415 COLL VENOUS BLD VENIPUNCTURE: CPT

## 2018-11-07 PROCEDURE — 84439 ASSAY OF FREE THYROXINE: CPT

## 2019-02-26 ENCOUNTER — HOSPITAL ENCOUNTER (OUTPATIENT)
Dept: HOSPITAL 62 - WI | Age: 74
End: 2019-02-26
Attending: INTERNAL MEDICINE
Payer: MEDICARE

## 2019-02-26 DIAGNOSIS — M81.0: Primary | ICD-10-CM

## 2019-02-26 PROCEDURE — 77080 DXA BONE DENSITY AXIAL: CPT

## 2019-02-26 NOTE — WOMENS IMAGING REPORT
EXAM DESCRIPTION:  BONE DENSITY HIP/SPINE



COMPLETED DATE/TIME:  2/26/2019 9:16 am



REASON FOR STUDY:  M81.0 AGE-RELATED OSTEOPOROSIS WITHOUT CURRENT PATHOLOGICAL FRACTURE M81.0  AGE-RE
LATED OSTEOPOROSIS W/O CURRENT PATHOLOGICAL FRAC



COMPARISON:   2014, 2017



TECHNIQUE:  Dual-Energy X-ray Absorptiometry (DEXA) of the AP Spine and Hip.



LIMITATIONS:  None.



FINDINGS:  LUMBAR SPINE:

The bone mineral density (BMD) measured from L1-L4 in the AP projection correlates with a T-score of 
-2.6, which is osteoporotic as defined by the World Health Organization.  This represents a 7% increa
se in bone density compared to 2017.

HIP:

The bone mineral density (BMD) measured in the left femoral neck at the hip correlates with a T-score
 of -3.7, which is osteoporotic as defined by the World Health Organization.  This is stable compared
 to bone density in 2017



IMPRESSION:  1. LUMBAR SPINE: Osteoporotic

2.  HIP: Osteoporotic



COMMENT:  The World Health Organization defines low BMD as follows:

T-score:

Normal:  Greater than -1.0

Osteopenia: Between -1.0 and -2.5

Osteoporosis:  Less than -2.5 without fractures

Established osteoporosis:  Less than -2.5 with fractures

In general, you may wish to consider:

Diagnosis          Treatment                     Follow-up DEXA

Normal BMD      Prevention                    2-3 years

Osteopenia       Prevention/Therapy        1-2 years

Osteoporosis     Therapy                        Yearly



TECHNICAL DOCUMENTATION:  JOB ID:  [id number]

 2011 [email or web address]- All Rights Reserved



Reading location - IP/workstation name: [email or web address]

## 2019-02-28 ENCOUNTER — HOSPITAL ENCOUNTER (OUTPATIENT)
Dept: HOSPITAL 62 - OD | Age: 74
End: 2019-02-28
Attending: INTERNAL MEDICINE
Payer: MEDICARE

## 2019-02-28 DIAGNOSIS — D50.8: ICD-10-CM

## 2019-02-28 DIAGNOSIS — E13.42: ICD-10-CM

## 2019-02-28 DIAGNOSIS — C34.32: Primary | ICD-10-CM

## 2019-02-28 DIAGNOSIS — N18.3: ICD-10-CM

## 2019-02-28 DIAGNOSIS — E03.9: ICD-10-CM

## 2019-02-28 DIAGNOSIS — D51.8: ICD-10-CM

## 2019-02-28 LAB
ABSOLUTE LYMPHOCYTES# (MANUAL): 11.3 10^3/UL (ref 0.5–4.7)
ABSOLUTE MONOCYTES # (MANUAL): 0 10^3/UL (ref 0.1–1.4)
ABSOLUTE NEUTROPHILS# (MANUAL): 4.8 10^3/UL (ref 1.7–8.2)
ADD MANUAL DIFF: YES
ALBUMIN SERPL-MCNC: 4.4 G/DL (ref 3.5–5)
ALP SERPL-CCNC: 94 U/L (ref 38–126)
ALT SERPL-CCNC: 10 U/L (ref 9–52)
ANION GAP SERPL CALC-SCNC: 11 MMOL/L (ref 5–19)
AST SERPL-CCNC: 22 U/L (ref 14–36)
BASOPHILS NFR BLD MANUAL: 0 % (ref 0–2)
BILIRUB DIRECT SERPL-MCNC: 0.3 MG/DL (ref 0–0.4)
BILIRUB SERPL-MCNC: 0.4 MG/DL (ref 0.2–1.3)
BUN SERPL-MCNC: 17 MG/DL (ref 7–20)
CALCIUM: 10 MG/DL (ref 8.4–10.2)
CHLORIDE SERPL-SCNC: 105 MMOL/L (ref 98–107)
CO2 SERPL-SCNC: 26 MMOL/L (ref 22–30)
EOSINOPHIL NFR BLD MANUAL: 0 % (ref 0–6)
ERYTHROCYTE [DISTWIDTH] IN BLOOD BY AUTOMATED COUNT: 12.8 % (ref 11.5–14)
FERRITIN SERPL-MCNC: 123 NG/ML (ref 11.1–264)
GLUCOSE SERPL-MCNC: 79 MG/DL (ref 75–110)
HCT VFR BLD CALC: 34.2 % (ref 36–47)
HGB BLD-MCNC: 12.4 G/DL (ref 12–15.5)
IRON(TIBC): 116.9 UG/DL (ref 37–170)
MACROCYTES BLD QL SMEAR: (no result)
MCH RBC QN AUTO: 36.7 PG (ref 27–33.4)
MCHC RBC AUTO-ENTMCNC: 36.2 G/DL (ref 32–36)
MCV RBC AUTO: 101 FL (ref 80–97)
METAMYELOCYTES % (MANUAL): 1 %
MONOCYTES % (MANUAL): 0 % (ref 3–13)
PLATELET # BLD: 338 10^3/UL (ref 150–450)
PLATELET COMMENT: ADEQUATE
POTASSIUM SERPL-SCNC: 5 MMOL/L (ref 3.6–5)
PROT SERPL-MCNC: 7.2 G/DL (ref 6.3–8.2)
RBC # BLD AUTO: 3.37 10^6/UL (ref 3.72–5.28)
SEGMENTED NEUTROPHILS % (MAN): 29 % (ref 42–78)
SODIUM SERPL-SCNC: 141.6 MMOL/L (ref 137–145)
T3 SERPL-MCNC: 0.82 NG/ML (ref 0.97–1.69)
TOTAL CELLS COUNTED BLD: 100
VARIANT LYMPHS NFR BLD MANUAL: 62 % (ref 13–45)
VIT B12 SERPL-MCNC: 344 PG/ML (ref 239–931)
WBC # BLD AUTO: 16.1 10^3/UL (ref 4–10.5)

## 2019-02-28 PROCEDURE — 82607 VITAMIN B-12: CPT

## 2019-02-28 PROCEDURE — 82728 ASSAY OF FERRITIN: CPT

## 2019-02-28 PROCEDURE — 84443 ASSAY THYROID STIM HORMONE: CPT

## 2019-02-28 PROCEDURE — 36415 COLL VENOUS BLD VENIPUNCTURE: CPT

## 2019-02-28 PROCEDURE — 83540 ASSAY OF IRON: CPT

## 2019-02-28 PROCEDURE — 85025 COMPLETE CBC W/AUTO DIFF WBC: CPT

## 2019-02-28 PROCEDURE — 80053 COMPREHEN METABOLIC PANEL: CPT

## 2019-02-28 PROCEDURE — 84436 ASSAY OF TOTAL THYROXINE: CPT

## 2019-02-28 PROCEDURE — 83550 IRON BINDING TEST: CPT

## 2019-02-28 PROCEDURE — 84480 ASSAY TRIIODOTHYRONINE (T3): CPT

## 2019-03-03 ENCOUNTER — HOSPITAL ENCOUNTER (OUTPATIENT)
Dept: HOSPITAL 62 - RAD | Age: 74
End: 2019-03-03
Attending: INTERNAL MEDICINE
Payer: MEDICARE

## 2019-03-03 DIAGNOSIS — C34.32: Primary | ICD-10-CM

## 2019-03-03 DIAGNOSIS — K57.90: ICD-10-CM

## 2019-03-03 DIAGNOSIS — K44.9: ICD-10-CM

## 2019-03-03 PROCEDURE — A9552 F18 FDG: HCPCS

## 2019-03-03 PROCEDURE — 78815 PET IMAGE W/CT SKULL-THIGH: CPT

## 2019-03-04 NOTE — RADIOLOGY REPORT (SQ)
EXAM DESCRIPTION:  PET CT SKULL/THIGH



COMPLETED DATE/TIME:  3/3/2019 8:47 pm



REASON FOR STUDY:  LUNG CANCER C34.32  MALIGNANT NEOPLASM OF LOWER LOBE, LEFT BRONCHUS OR TJ



COMPARISON:  9/30/2018



RADIONUCLIDE AND DOSE:  11.4 mCi F18 FDG

The route of agent administration: Intravenous



FASTING BLOOD SUGAR:  86 mg/dl



CONTRAST TYPE AND DOSE:  No CT contrast given.



TECHNIQUE:  Blood glucose level was verified.  Above dose of FDG was injected intravenously.  2-D seg
mented attenuation correction images were obtained from the base of the skull to the midthighs.  Nonc
ontrast CT images were obtained for attenuation correction and fusion with emission images.  CT image
s were performed without oral or intravenous contrast and are not sensitive for parenchymal lesions. 
 A series of overlapping emission PET images were obtained.  Images reviewed and manipulated at [facility] work station by the radiologist.  Images stored on PACS.



LIMITATIONS:  None.



FINDINGS:  HEAD AND NECK: No areas of abnormal metabolic activity in the soft tissues of the head and
 neck.

CHEST: Left lower lobe lesion 1.8 x 4.1 cm unchanged morphologically with SUV 6.8, previously 5.4.  S
tation 7 partially calcified lymph node with SUV 5.3, unchanged.

ABDOMEN AND PELVIS: No areas of abnormal metabolic activity in the abdomen or pelvis.  Expected physi
ologic activity is present in the genitourinary system and bowel.

PROXIMAL LOWER EXTREMITIES: No areas of abnormal metabolic activity in the soft tissues of the lower 
extremities.

BONES: No abnormal metabolic activity in the visualized skeleton.

ADDITIONAL CT FINDINGS: Hiatal hernia.  Diverticulosis.

OTHER: Blood pool 2.0 SUV.  Background liver 2.7 SUV.



IMPRESSION:  Interval increase in SUV primary lesion.  No significant change morphologically.



TECHNICAL DOCUMENTATION:  JOB ID:  [id number]

 2011 [employer]- All Rights Reserved



Reading location - IP/workstation name: [email or web address]

## 2019-09-15 ENCOUNTER — HOSPITAL ENCOUNTER (OUTPATIENT)
Dept: HOSPITAL 62 - RAD | Age: 74
End: 2019-09-15
Attending: INTERNAL MEDICINE
Payer: MEDICARE

## 2019-09-15 DIAGNOSIS — C34.32: Primary | ICD-10-CM

## 2019-09-15 PROCEDURE — 78815 PET IMAGE W/CT SKULL-THIGH: CPT

## 2019-09-15 PROCEDURE — A9552 F18 FDG: HCPCS

## 2019-09-16 NOTE — RADIOLOGY REPORT (SQ)
EXAM DESCRIPTION:  PET CT SKULL/THIGH



COMPLETED DATE/TIME:  9/16/2019 5:25 am



REASON FOR STUDY:  C34.32 MALIGNANT NEOPLASM OF LOWER LOBE, LEFT BRONCHUS OR LUNG C34.32  MALIGNANT N
EOPLASM OF LOWER LOBE, LEFT BRONCHUS OR TJ



COMPARISON:  3/3/2019 and 9/30/2018.



RADIONUCLIDE AND DOSE:  10 mCi F18 FDG

The route of agent administration: Intravenous



FASTING BLOOD SUGAR:  85 mg/dl



CONTRAST TYPE AND DOSE:  No CT contrast given.



TECHNIQUE:  Blood glucose level was verified.  Above dose of FDG was injected intravenously.  2-D seg
mented attenuation correction images were obtained from the base of the skull to the midthighs.  Nonc
ontrast CT images were obtained for attenuation correction and fusion with emission images.  CT image
s were performed without oral or intravenous contrast and are not sensitive for parenchymal lesions. 
 A series of overlapping emission PET images were obtained.  Images reviewed and manipulated at [facility] work station by the radiologist.  Images stored on PACS.



LIMITATIONS:  None.



FINDINGS:  HEAD AND NECK: No areas of abnormal metabolic activity in the soft tissues of the head and
 neck.

CHEST: Mass in the left lower lobe is larger, maximum transverse measurements 2.7 x 4.0 cm.  Previous
 measurement 1.8 x 4.1 cm.  Mean SUV 13.55.  Prior value 6.8.  1 cm partially calcified subcarinal ly
mph node with current mean SUV 9.09.  Prior value 5.3.  Small lymph node in the right hilum measuring
 6 mm (axial series 3, image 67).  Mean SUV 2.32.  Slightly more inferiorly is a small lymph node in 
the right hilum measuring 4 mm (axial series 3, image 74).  Mean SUV 2.41.

ABDOMEN AND PELVIS: No areas of abnormal metabolic activity in the abdomen or pelvis.  Expected physi
ologic activity is present in the genitourinary system and bowel.

PROXIMAL LOWER EXTREMITIES: No areas of abnormal metabolic activity in the soft tissues of the lower 
extremities.

BONES: No abnormal metabolic activity in the visualized skeleton.

ADDITIONAL CT FINDINGS: Hiatal hernia.  No additional significant findings on the noncontrast CT imag
es.

OTHER: Background blood pool activity mean SUV 1.57.  Background liver activity mean SUV 2.54.  No ot
her significant findings.



IMPRESSION:

1. THE MASS IN THE LEFT LOWER LOBE IS LARGER WITH HIGHER SUV VALUE, CONCERNING FOR PROGRESSION.  ALSO
 THE PREVIOUSLY SEEN HYPERMETABOLIC SUBCARINAL LYMPH NODE HAS A HIGHER SUV VALUE AS WELL.  IN ADDITIO
N THERE ARE 2 VERY SMALL LYMPH NODES IN THE RIGHT HILUM WHICH HAVE BORDERLINE SUV VALUES, ROUGHLY EQU
IVALENT TO THE BACKGROUND LIVER ACTIVITY.  THESE ARE NONSPECIFIC BUT POTENTIALLY COULD INDICATE DEVEL
OPING METASTASES AND NEED TO BE FOLLOWED.

2. NO EVIDENCE OF MALIGNANT INVOLVEMENT ELSEWHERE.  NO OTHER SIGNIFICANT FINDINGS.



TECHNICAL DOCUMENTATION:  JOB ID:  [id number]

 2011 [employer]- All Rights Reserved



Reading location - IP/workstation name: [email or web address]

## 2020-01-14 ENCOUNTER — HOSPITAL ENCOUNTER (OUTPATIENT)
Dept: HOSPITAL 62 - RAD | Age: 75
End: 2020-01-14
Attending: INTERNAL MEDICINE
Payer: MEDICARE

## 2020-01-14 DIAGNOSIS — C34.32: Primary | ICD-10-CM

## 2020-01-14 PROCEDURE — 78815 PET IMAGE W/CT SKULL-THIGH: CPT

## 2020-01-14 PROCEDURE — A9552 F18 FDG: HCPCS

## 2020-01-15 NOTE — RADIOLOGY REPORT (SQ)
EXAM DESCRIPTION:  PET CT SKULL/THIGH



COMPLETED DATE/TIME:  1/14/2020 8:06 pm



REASON FOR STUDY:  C34.32 MALIGNANT NEOPLASM OF LOWER LOBE, LEFT BRONCHUS OR LUNG C34.32  MALIGNANT N
EOPLASM OF LOWER LOBE, LEFT BRONCHUS OR TJ



COMPARISON:  PET from 9/15/2019.



RADIONUCLIDE AND DOSE:  9.6 mCi F18 FDG

The route of agent administration: Intravenous



FASTING BLOOD SUGAR:  87 mg/dl



CONTRAST TYPE AND DOSE:  No CT contrast given.



TECHNIQUE:  Blood glucose level was verified.  Above dose of FDG was injected intravenously.  2-D seg
mented attenuation correction images were obtained from the base of the skull to the midthighs.  Nonc
ontrast CT images were obtained for attenuation correction and fusion with emission images.  CT image
s were performed without oral or intravenous contrast and are not sensitive for parenchymal lesions. 
 A series of overlapping emission PET images were obtained.  Images [facility] and manipulated at [facility] work station by the radiologist.  Images stored on PACS.



LIMITATIONS:  None.



FINDINGS:  HEAD AND NECK: No areas of abnormal metabolic activity in the soft tissues of the head and
 neck.

CHEST: The maximum transverse diameter of the mass in the left lower lobe that extends to the hilum a
nd contains fiducial markers is unchanged and it measures 3.7 cm.  The intensity of FDG uptake within
 the mass is also unchanged with a maximum SUV of 17.2 (average liver SUV of 2.3; prior average SUV o
f 2.6) compared to 17.9 on the prior PET.  There is a hypermetabolic partially calcified subcarinal l
ymph node that measures 8 mm in short axis diameter ; the intensity of FDG uptake within the node is 
relatively unchanged (maximum SUV of 9 compared to 9.9 on the prior PET).  There are prominent right 
upper paratracheal and right lower paratracheal lymph nodes that are stable in size from the prior PE
T and demonstrate mild FDG uptake with maximum SUVs in the range of 3 to 3.3; the intensity of FDG up
take within these nodes is unchanged.

ABDOMEN AND PELVIS: The liver demonstrates heterogeneous non focal FDG uptake with an average SUV of 
2.3.  There is expected physiologic activity throughout the gastrointestinal and genitourinary tracts
.  No areas of abnormal metabolic activity are identified in the abdomen and pelvis.

PROXIMAL LOWER EXTREMITIES: No areas of abnormal metabolic activity in the soft tissues of the lower 
extremities.

BONES: No areas of abnormal metabolic activity in the imaged axial and appendicular skeleton

ADDITIONAL CT FINDINGS: The paramediastinal area of consolidation in the left upper lobe with air bro
nchograms is unchanged.

OTHER: No other findings.



IMPRESSION:  Stable metabolic activity within the mass in the left lower lobe and with and within a s
ub- carinal lymph node.



TECHNICAL DOCUMENTATION:  JOB ID:  [id number]

 2011 [employer]- All Rights Reserved



Reading location - IP/workstation name: [email or web address]

## 2020-02-13 ENCOUNTER — HOSPITAL ENCOUNTER (OUTPATIENT)
Dept: HOSPITAL 62 - RAD | Age: 75
End: 2020-02-13
Attending: INTERNAL MEDICINE
Payer: MEDICARE

## 2020-02-13 DIAGNOSIS — R56.9: Primary | ICD-10-CM

## 2020-02-13 DIAGNOSIS — C79.31: ICD-10-CM

## 2020-02-13 DIAGNOSIS — I67.1: ICD-10-CM

## 2020-02-13 DIAGNOSIS — C34.32: ICD-10-CM

## 2020-02-13 PROCEDURE — A9576 INJ PROHANCE MULTIPACK: HCPCS

## 2020-02-13 PROCEDURE — 70544 MR ANGIOGRAPHY HEAD W/O DYE: CPT

## 2020-02-13 PROCEDURE — 70553 MRI BRAIN STEM W/O & W/DYE: CPT

## 2020-02-13 PROCEDURE — 82565 ASSAY OF CREATININE: CPT

## 2020-02-13 NOTE — RADIOLOGY REPORT (SQ)
EXAM DESCRIPTION:  MRI HEAD COMBO



COMPLETED DATE/TIME:  2/13/2020 6:27 pm



REASON FOR STUDY:  C34.32 MALIGNANT NEOPLASM OF LOWER LOBE, LEFT BRONCHUS OR LUNG C34.32  MALIGNANT N
EOPLASM OF LOWER LOBE, LEFT BRONCHUS OR TJ R56.9  UNSPECIFIED CONVULSIONS I67.1  CEREBRAL ANEURYSM, N
ONRUPTURED



COMPARISON:  None.



TECHNIQUE:  Multiplanar imaging includes noncontrasted T1, T2, FLAIR, diffusion with ADC map and post
gadolinium contrast T1 sequences. Images stored on PACS.



CONTRAST TYPE AND DOSE:  10 mL Dotarem.



RENAL FUNCTION:  Not indicated.  ACR Type II contrast agent associated with few, if any, unconfounded
 cases of NSF



LIMITATIONS:  None.



FINDINGS:  ANATOMY: No anomalies. Normal vascular flow voids. Pituitary fossa normal.

CSF SPACES: Normal in size and contour. No hemorrhage.

CEREBRUM: There is a 6 mm enhancing lesion in the right occipital lobe.  There is a 10 mm enhancing l
esion in the medial aspect of the right occipital lobe.  There is a 7 mm enhancing lesion in the left
 frontal lobe.  There is an 11 mm enhancing lesion the left posterior parietal lobe.  There is edema 
associated with these lesions, most prominently in the left parietal lobe.  There is also edema in th
e right occipital lobe and in the left frontal lobe.

POSTERIOR FOSSA: No signal alteration. No hemorrhage. No edema, masses, or mass effect. Internal pradeep
tory canals, cerebellopontine angles, mastoids normal. No enhancing lesions. No abnormal enhancement 
post contrast.

DIFFUSION IMAGING: No significant abnormal diffusion.

ORBITS: No masses. Globes normal.

PARANASAL SINUSES: No fluid levels. Mucosa normal.

OTHER: No other significant finding.



IMPRESSION:  There are 4 well-defined enhancing metastatic lesions in the brain as described.  There 
is associated edema that is most prominent in the left posterior parietal lobe.

EVIDENCE OF ACUTE STROKE: NO.



TECHNICAL DOCUMENTATION:  JOB ID:  [id number]

 2011 [employer]- All Rights Reserved



Reading location - IP/workstation name: [email or web address]

## 2020-02-13 NOTE — RADIOLOGY REPORT (SQ)
EXAM DESCRIPTION:  MRA HEAD WITHOUT



COMPLETED DATE/TIME:  2/13/2020 6:27 pm



REASON FOR STUDY:  C34.32 MALIGNANT NEOPLASM OF LOWER LOBE, LEFT BRONCHUS OR LUNG C34.32  MALIGNANT N
EOPLASM OF LOWER LOBE, LEFT BRONCHUS OR TJ R56.9  UNSPECIFIED CONVULSIONS I67.1  CEREBRAL ANEURYSM, N
ONRUPTURED



COMPARISON:  None.



TECHNIQUE:  Axial 3-D time-of-flight acquisition imaging performed through the brain in the area of t
he [place].  Images reformatted using 3-D MIPS.



LIMITATIONS:  None.



FINDINGS:  SOURCE IMAGES: No unexpected findings on source images.  No large masses.

3-D MIP: No aneurysm.  No occlusions.  No significant stenosis.

OTHER: No other significant finding.



IMPRESSION:  NORMAL MRA OF THE [place].



TECHNICAL DOCUMENTATION:  JOB ID:  [id number]

 2011 [employer]- All Rights Reserved



Reading location - IP/workstation name: [email or web address]

## 2020-03-28 ENCOUNTER — HOSPITAL ENCOUNTER (OUTPATIENT)
Dept: HOSPITAL 62 - RAD | Age: 75
End: 2020-03-28
Attending: SPECIALIST
Payer: MEDICARE

## 2020-03-28 DIAGNOSIS — R06.02: Primary | ICD-10-CM

## 2020-03-28 DIAGNOSIS — D02.20: ICD-10-CM

## 2020-03-28 DIAGNOSIS — R09.02: ICD-10-CM

## 2020-03-28 DIAGNOSIS — R06.09: ICD-10-CM

## 2020-03-28 PROCEDURE — 71046 X-RAY EXAM CHEST 2 VIEWS: CPT

## 2020-03-28 NOTE — RADIOLOGY REPORT (SQ)
EXAM DESCRIPTION:  CHEST 2 VIEWS



IMAGES COMPLETED DATE/TIME:  3/28/2020 4:14 pm



REASON FOR STUDY:  SOB



COMPARISON:  6/5/2017.  PET-CT 1/15/2020.



TECHNIQUE:  Frontal and lateral radiographic views of the chest acquired.



NUMBER OF VIEWS:  Two view.



LIMITATIONS:  None.



FINDINGS:  LUNGS AND PLEURA: Fairly extensive bilateral lower lung field interstitial changes.  Retic
ular opacities diffusely with slightly confluent density in the left base.  Trace left pleural reacti
on may be present.  No pneumothorax.  No discrete nodules or masses.

MEDIASTINUM AND HILAR STRUCTURES: Stable contours.

HEART AND VASCULAR STRUCTURES: Stable heart size.

BONES: No acute findings.

HARDWARE: None in the chest.

OTHER: No other significant finding.



IMPRESSION:

1. Interstitial changes throughout the lower lung fields, left greater than right.  Differential incl
udes interstitial pneumonia, viral pneumonitis, interstitial edema.  No such interstitial changes are
 suggested on January PET-CT, findings look new since then.



TECHNICAL DOCUMENTATION:  JOB ID:  [id number]

 2011 [employer]- All Rights Reserved



Reading location - IP/workstation name: [email or web address]

## 2020-05-05 ENCOUNTER — HOSPITAL ENCOUNTER (OUTPATIENT)
Dept: HOSPITAL 62 - RAD | Age: 75
End: 2020-05-05
Attending: INTERNAL MEDICINE
Payer: MEDICARE

## 2020-05-05 DIAGNOSIS — C34.32: Primary | ICD-10-CM

## 2020-05-05 PROCEDURE — 78815 PET IMAGE W/CT SKULL-THIGH: CPT

## 2020-05-05 PROCEDURE — A9552 F18 FDG: HCPCS

## 2020-05-05 NOTE — RADIOLOGY REPORT (SQ)
EXAM DESCRIPTION:  PET CT SKULL/THIGH



IMAGES COMPLETED DATE/TIME:  5/5/2020 10:44 am



REASON FOR STUDY:  MALIGNANT NEOPLASM OF LOWER LOBE, LEFT BRONCHUS OR LUNG (C34.32) C34.32  MALIGNANT
 NEOPLASM OF LOWER LOBE, LEFT BRONCHUS OR TJ



COMPARISON:  PET from 1/14/2020.



RADIONUCLIDE AND DOSE:  9.57 mCi F18 FDG

The route of agent administration: Intravenous



FASTING BLOOD SUGAR:  59 mg/dl



CONTRAST TYPE AND DOSE:  No CT contrast given.



TECHNIQUE:  Blood glucose level was verified.  Above dose of FDG was injected intravenously.  2-D seg
mented attenuation correction images were obtained from the base of the skull to the midthighs.  Nonc
ontrast CT images were obtained for attenuation correction and fusion with emission images.  CT image
s were performed without oral or intravenous contrast and are not sensitive for parenchymal lesions. 
 A series of overlapping emission PET images were obtained.  Images reviewed and manipulated at [facility]ep
[employer] work station by the radiologist.  Images stored on PACS.



LIMITATIONS:  None.



FINDINGS:  HEAD AND NECK: No areas of abnormal metabolic activity in the soft tissues of the head and
 neck.

CHEST: The 6 mm short axis right highest mediastinal lymph node on image 52 of series 3 demonstrates 
minimal FDG uptake with a maximum SUV of 2.8.  The 7 mm AP window and right lower paratracheal lymph 
nodes on images 64 and 66 of series 3 are unchanged in size and demonstrate no abnormal FDG uptake.  
There increased FDG uptake in the sub- carinal region that extends into the hilum with a maximum SUV 
of 6.4.  The maximum AP diameter of the dominant mass that extends from the left hilum into the left 
lower lobe has increased and it measures 3.4 cm compared to 3.1 on the prior PET ; the intensity of F
DG uptake within the mass has decreased with a maximum SUV of 13.1 compared to 17.2 on the prior PET,
 however it is possible that direct comparison of maximum SUVs is limited given the difference in the
 average SUVs between the PETs.  There are several new diaphragmatic implants that vary in size;  the
se include the 8 x 7 mm nodule on image 97 of series 3 (maximum SUV of 5.6), the 11 mm short axis nod
ule on image 94 of series 3 (maximum SUV of 5.2), and the 13 x 5 mm nodule on image 104 of series 3 (
maximum SUV is 6.8).

The consolidative opacities in the left upper lobe associated with mild bronchiectasis have increased
 and demonstrate mild FDG uptake with a maximum SUV of 3.6.

ABDOMEN AND PELVIS: The liver demonstrates homogeneous FDG uptake with an average SUV of 1.6 (compare
d to 2.3 on the prior PET).  There is focal elevated FDG uptake within the right adrenal gland with a
 maximum SUV of 4.1.  There is also focal increased FDG uptake within a 14 x 11 mm omental implant (i
mage 131 of series 3) with a maximum SUV of 5.6.  Otherwise, there is expected physiologic activity t
hroughout the gastrointestinal and genitourinary tracts.

PROXIMAL LOWER EXTREMITIES: No areas of abnormal metabolic activity in the soft tissues of the lower 
extremities.

BONES: Focal increase FDG uptake within the left ilium (maximum SUV of 3.6) that on the CT correspond
s to an area of subtle sclerosis.

ADDITIONAL CT FINDINGS: No acute findings.

OTHER: No other findings.



IMPRESSION:  Findings as described above are consistent with progression of disease given the new hyp
ermetabolic diaphragmatic, omental, right adrenal and osseous metastases as detailed above.  The cons
olidative opacities in the left upper lobe associated with mild bronchiectasis that have increased fr
om the prior PET are nonspecific and could represent postradiation pneumonitis, pneumonia, or lymphan
gitic carcinomatosis.



TECHNICAL DOCUMENTATION:  JOB ID:  [id number]

 2011 [employer]- All Rights Reserved



Reading location - IP/workstation name: [email or web address]

## 2020-07-28 ENCOUNTER — HOSPITAL ENCOUNTER (OUTPATIENT)
Dept: HOSPITAL 62 - RAD | Age: 75
End: 2020-07-28
Attending: INTERNAL MEDICINE
Payer: MEDICARE

## 2020-07-28 DIAGNOSIS — C34.32: Primary | ICD-10-CM

## 2020-07-28 PROCEDURE — 78815 PET IMAGE W/CT SKULL-THIGH: CPT

## 2020-07-28 PROCEDURE — A9552 F18 FDG: HCPCS

## 2020-07-28 NOTE — RADIOLOGY REPORT (SQ)
EXAM DESCRIPTION:  PET CT SKULL/THIGH



IMAGES COMPLETED DATE/TIME:  7/28/2020 11:23 am



REASON FOR STUDY:  C34.32 MALIGNANT NEOPLASM OF LOWER LOBE, LEFT BRONCHUS OR LUNG C34.32  MALIGNANT N
EOPLASM OF LOWER LOBE, LEFT BRONCHUS OR TJ



COMPARISON:  5/5/2020



RADIONUCLIDE AND DOSE:  9.9 mCi F18 FDG

The route of agent administration: Intravenous



FASTING BLOOD SUGAR:  51 mg/dl



CONTRAST TYPE AND DOSE:  No CT contrast given.



TECHNIQUE:  Blood glucose level was verified.  Above dose of FDG was injected intravenously.  2-D seg
mented attenuation correction images were obtained from the base of the skull to the midthighs.  Nonc
ontrast CT images were obtained for attenuation correction and fusion with emission images.  CT image
s were performed without oral or intravenous contrast and are not sensitive for parenchymal lesions. 
 A series of overlapping emission PET images were obtained.  Images reviewed and manipulated at [facility] work station by the radiologist.  Images stored on PACS.



LIMITATIONS:  None.



FINDINGS:  HEAD AND NECK: No areas of abnormal metabolic activity in the soft tissues of the head and
 neck.

CHEST: Rind of soft tissue in the left hilum and left lower lobe maximum SUV 5.8, previously 13 SUV. 
 Morphologically, the maximum diameter adjacent to the radiation markers 2.4 cm, previously 3.4 cm.

ABDOMEN AND PELVIS: No areas of abnormal metabolic activity in the abdomen or pelvis.  Expected physi
ologic activity is present in the genitourinary system and bowel.

PROXIMAL LOWER EXTREMITIES: No areas of abnormal metabolic activity in the soft tissues of the lower 
extremities.

BONES: No abnormal metabolic activity in the visualized skeleton.

ADDITIONAL CT FINDINGS: Small left pleural effusion.  Appropriate position of left-sided port.

OTHER: Blood pool 1.5 SUV.  Liver background 2.4 SUV.



IMPRESSION:  Favorable response to therapy.  Increased metabolic activity is confined to the primary 
lung mass.



TECHNICAL DOCUMENTATION:  JOB ID:  [id number]

 2011 [employer]- All Rights Reserved



Reading location - IP/workstation name: [email or web address]

## 2020-09-21 ENCOUNTER — HOSPITAL ENCOUNTER (OUTPATIENT)
Dept: HOSPITAL 62 - RAD | Age: 75
End: 2020-09-21
Attending: INTERNAL MEDICINE
Payer: MEDICARE

## 2020-09-21 ENCOUNTER — HOSPITAL ENCOUNTER (OUTPATIENT)
Dept: HOSPITAL 62 - ASU | Age: 75
End: 2020-09-21
Attending: INTERNAL MEDICINE
Payer: MEDICARE

## 2020-09-21 DIAGNOSIS — G45.9: ICD-10-CM

## 2020-09-21 DIAGNOSIS — E83.42: Primary | ICD-10-CM

## 2020-09-21 DIAGNOSIS — C34.32: Primary | ICD-10-CM

## 2020-09-21 PROCEDURE — 70544 MR ANGIOGRAPHY HEAD W/O DYE: CPT

## 2020-09-21 PROCEDURE — 96365 THER/PROPH/DIAG IV INF INIT: CPT

## 2020-09-21 PROCEDURE — 96367 TX/PROPH/DG ADDL SEQ IV INF: CPT

## 2020-09-21 PROCEDURE — A9576 INJ PROHANCE MULTIPACK: HCPCS

## 2020-09-21 PROCEDURE — 70553 MRI BRAIN STEM W/O & W/DYE: CPT

## 2020-09-21 PROCEDURE — 96366 THER/PROPH/DIAG IV INF ADDON: CPT

## 2020-09-21 RX ADMIN — CALCIUM GLUCONATE SCH MLS/HR: 20 INJECTION, SOLUTION INTRAVENOUS at 18:00

## 2020-09-21 RX ADMIN — CALCIUM GLUCONATE SCH MLS/HR: 20 INJECTION, SOLUTION INTRAVENOUS at 17:00

## 2020-09-21 RX ADMIN — MAGNESIUM SULFATE IN DEXTROSE SCH MLS/HR: 10 INJECTION, SOLUTION INTRAVENOUS at 16:00

## 2020-09-21 RX ADMIN — MAGNESIUM SULFATE IN DEXTROSE SCH MLS/HR: 10 INJECTION, SOLUTION INTRAVENOUS at 15:00

## 2020-09-21 NOTE — RADIOLOGY REPORT (SQ)
EXAM DESCRIPTION:  MRA HEAD WITHOUT



IMAGES COMPLETED DATE/TIME:  9/21/2020 2:38 pm



REASON FOR STUDY:  C34.32 MALIGNANT NEOPLASM OF LOWER LOBE, LEFT BRONCHUS OR LUNG C34.32  MALIGNANT N
EOPLASM OF LOWER LOBE, LEFT BRONCHUS OR TJ G45.9  TRANSIENT CEREBRAL ISCHEMIC ATTACK, UNSPECIFIED



COMPARISON:  2/13/2020.



TECHNIQUE:  Axial 3-D time-of-flight acquisition imaging performed through the brain in the area of t
he [place].  Images reformatted using 3-D MIPS.



LIMITATIONS:  This study is significantly limited by motion artifact.



FINDINGS:  SOURCE IMAGES: No unexpected findings on source images.  No large masses.

3-D MIP: No aneurysm.  No occlusions.  No significant stenosis.

OTHER: No other significant finding.



IMPRESSION:  Limiting motion artifact.  No evidence of arterial occlusion or aneurysm.



TECHNICAL DOCUMENTATION:  JOB ID:  [id number]

 2011 [employer]- All Rights Reserved



Reading location - IP/workstation name: [email or web address]

## 2020-09-21 NOTE — RADIOLOGY REPORT (SQ)
EXAM DESCRIPTION:  MRI HEAD COMBO



IMAGES COMPLETED DATE/TIME:  9/21/2020 2:38 pm



REASON FOR STUDY:  C34.32 MALIGNANT NEOPLASM OF LOWER LOBE, LEFT BRONCHUS OR LUNG C34.32  MALIGNANT N
EOPLASM OF LOWER LOBE, LEFT BRONCHUS OR TJ G45.9  TRANSIENT CEREBRAL ISCHEMIC ATTACK, UNSPECIFIED



COMPARISON:  2/13/2020.



TECHNIQUE:  Multiplanar imaging includes noncontrasted T1, T2, FLAIR, diffusion with ADC map and post
gadolinium contrast T1 sequences. Images stored on PACS.



CONTRAST TYPE AND DOSE:  10 mL Prohance.



RENAL FUNCTION:  Not indicated.  ACR Type II contrast agent associated with few, if any, unconfounded
 cases of NSF



LIMITATIONS:  This study is significantly limited by motion artifact.  Particularly evident on post c
ontrast imaging.  This is up to marked.



FINDINGS:  ANATOMY: No anomalies. Normal vascular flow voids. Pituitary fossa normal.

CSF SPACES: Normal in size and contour. No hemorrhage.

CEREBRUM: There appears to be some improvement in lesions, even allowing for significantly limiting m
otion artifact.  The left frontal lesion looks smaller, probably close to 8 mm.  Mild persistent christal
onal edema.  The left parietal parafalcine lesion is very difficult to see, faintly hyperintense T1 w
ith slight enhancement, also likely 8 mm.  The right occipital lobe lesions are not reliably identifi
ed on post contrast imaging, subtle artifact noted on the gradient axial sequence persists.

POSTERIOR FOSSA: No signal alteration. No hemorrhage. No edema, masses, or mass effect. Internal pradeep
tory canals, cerebellopontine angles, mastoids normal. No enhancing lesions. No abnormal enhancement 
post contrast.

DIFFUSION IMAGING: Negative for acute or subacute infarction.

ORBITS: No masses. Globes normal.

PARANASAL SINUSES: No fluid levels. Mucosa normal.

OTHER: No other significant finding.



IMPRESSION:  Improved lesions as above.  Right occipital lesions are poorly seen.  Left-sided lesions
 look smaller, less conspicuous.  It should be noted that this study is significantly limited by delvin
on artifact however.

EVIDENCE OF ACUTE STROKE: NO.



TECHNICAL DOCUMENTATION:  JOB ID:  [id number]

 2011 [employer]- All Rights Reserved



Reading location - IP/workstation name: [email or web address]

## 2020-09-22 ENCOUNTER — HOSPITAL ENCOUNTER (INPATIENT)
Dept: HOSPITAL 62 - ER | Age: 75
LOS: 6 days | Discharge: HOSPICE | DRG: 100 | End: 2020-09-28
Attending: INTERNAL MEDICINE | Admitting: INTERNAL MEDICINE
Payer: MEDICARE

## 2020-09-22 DIAGNOSIS — G40.211: Primary | ICD-10-CM

## 2020-09-22 DIAGNOSIS — Z79.899: ICD-10-CM

## 2020-09-22 DIAGNOSIS — C91.10: ICD-10-CM

## 2020-09-22 DIAGNOSIS — Z92.3: ICD-10-CM

## 2020-09-22 DIAGNOSIS — E86.0: ICD-10-CM

## 2020-09-22 DIAGNOSIS — Z79.52: ICD-10-CM

## 2020-09-22 DIAGNOSIS — E43: ICD-10-CM

## 2020-09-22 DIAGNOSIS — Z66: ICD-10-CM

## 2020-09-22 DIAGNOSIS — R33.9: ICD-10-CM

## 2020-09-22 DIAGNOSIS — F41.9: ICD-10-CM

## 2020-09-22 DIAGNOSIS — Z88.2: ICD-10-CM

## 2020-09-22 DIAGNOSIS — Z88.0: ICD-10-CM

## 2020-09-22 DIAGNOSIS — I10: ICD-10-CM

## 2020-09-22 DIAGNOSIS — C79.31: ICD-10-CM

## 2020-09-22 DIAGNOSIS — E87.1: ICD-10-CM

## 2020-09-22 DIAGNOSIS — J90: ICD-10-CM

## 2020-09-22 DIAGNOSIS — Z92.21: ICD-10-CM

## 2020-09-22 DIAGNOSIS — C34.32: ICD-10-CM

## 2020-09-22 DIAGNOSIS — G81.91: ICD-10-CM

## 2020-09-22 DIAGNOSIS — E83.42: ICD-10-CM

## 2020-09-22 DIAGNOSIS — E83.51: ICD-10-CM

## 2020-09-22 DIAGNOSIS — D64.9: ICD-10-CM

## 2020-09-22 DIAGNOSIS — E03.9: ICD-10-CM

## 2020-09-22 LAB
ABSOLUTE LYMPHOCYTES# (MANUAL): 6.2 10^3/UL (ref 0.5–4.7)
ABSOLUTE MONOCYTES # (MANUAL): 0.4 10^3/UL (ref 0.1–1.4)
ADD MANUAL DIFF: YES
ALBUMIN SERPL-MCNC: 3 G/DL (ref 3.5–5)
ALP SERPL-CCNC: 338 U/L (ref 38–126)
ANION GAP SERPL CALC-SCNC: 7 MMOL/L (ref 5–19)
ANISOCYTOSIS BLD QL SMEAR: (no result)
APPEARANCE UR: CLEAR
APTT PPP: YELLOW S
AST SERPL-CCNC: 128 U/L (ref 14–36)
BASOPHILS NFR BLD MANUAL: 0 % (ref 0–2)
BILIRUB DIRECT SERPL-MCNC: 0.4 MG/DL (ref 0–0.4)
BILIRUB SERPL-MCNC: 0.4 MG/DL (ref 0.2–1.3)
BILIRUB UR QL STRIP: NEGATIVE
BUN SERPL-MCNC: 19 MG/DL (ref 7–20)
CALCIUM: 8.8 MG/DL (ref 8.4–10.2)
CHLORIDE SERPL-SCNC: 96 MMOL/L (ref 98–107)
CO2 SERPL-SCNC: 23 MMOL/L (ref 22–30)
EOSINOPHIL NFR BLD MANUAL: 0 % (ref 0–6)
ERYTHROCYTE [DISTWIDTH] IN BLOOD BY AUTOMATED COUNT: 16.3 % (ref 11.5–14)
GLUCOSE SERPL-MCNC: 130 MG/DL (ref 75–110)
GLUCOSE UR STRIP-MCNC: NEGATIVE MG/DL
HCT VFR BLD CALC: 31.5 % (ref 36–47)
HGB BLD-MCNC: 10.2 G/DL (ref 12–15.5)
INR PPP: 0.96
KETONES UR STRIP-MCNC: NEGATIVE MG/DL
MCH RBC QN AUTO: 29.3 PG (ref 27–33.4)
MCHC RBC AUTO-ENTMCNC: 32.3 G/DL (ref 32–36)
MCV RBC AUTO: 91 FL (ref 80–97)
MONOCYTES % (MANUAL): 1 % (ref 3–13)
NEUTS BAND NFR BLD MANUAL: 1 % (ref 3–5)
NITRITE UR QL STRIP: NEGATIVE
PH UR STRIP: 6 [PH] (ref 5–9)
PLATELET # BLD: 429 10^3/UL (ref 150–450)
PLATELET COMMENT: ADEQUATE
POTASSIUM SERPL-SCNC: 4.8 MMOL/L (ref 3.6–5)
PROT SERPL-MCNC: 5.5 G/DL (ref 6.3–8.2)
PROT UR STRIP-MCNC: NEGATIVE MG/DL
PROTHROMBIN TIME: 13 SEC (ref 11.4–15.4)
RBC # BLD AUTO: 3.47 10^6/UL (ref 3.72–5.28)
SEGMENTED NEUTROPHILS % (MAN): 82 % (ref 42–78)
SP GR UR STRIP: 1.01
TOTAL CELLS COUNTED BLD: 100
UROBILINOGEN UR-MCNC: NEGATIVE MG/DL (ref ?–2)
VARIANT LYMPHS NFR BLD MANUAL: 16 % (ref 13–45)
WBC # BLD AUTO: 39 10^3/UL (ref 4–10.5)

## 2020-09-22 PROCEDURE — 95819 EEG AWAKE AND ASLEEP: CPT

## 2020-09-22 PROCEDURE — C9113 INJ PANTOPRAZOLE SODIUM, VIA: HCPCS

## 2020-09-22 PROCEDURE — 84100 ASSAY OF PHOSPHORUS: CPT

## 2020-09-22 PROCEDURE — 71260 CT THORAX DX C+: CPT

## 2020-09-22 PROCEDURE — 85025 COMPLETE CBC W/AUTO DIFF WBC: CPT

## 2020-09-22 PROCEDURE — 93005 ELECTROCARDIOGRAM TRACING: CPT

## 2020-09-22 PROCEDURE — 80053 COMPREHEN METABOLIC PANEL: CPT

## 2020-09-22 PROCEDURE — 85610 PROTHROMBIN TIME: CPT

## 2020-09-22 PROCEDURE — 99285 EMERGENCY DEPT VISIT HI MDM: CPT

## 2020-09-22 PROCEDURE — 83605 ASSAY OF LACTIC ACID: CPT

## 2020-09-22 PROCEDURE — 82140 ASSAY OF AMMONIA: CPT

## 2020-09-22 PROCEDURE — 80185 ASSAY OF PHENYTOIN TOTAL: CPT

## 2020-09-22 PROCEDURE — 36415 COLL VENOUS BLD VENIPUNCTURE: CPT

## 2020-09-22 PROCEDURE — G0378 HOSPITAL OBSERVATION PER HR: HCPCS

## 2020-09-22 PROCEDURE — 82306 VITAMIN D 25 HYDROXY: CPT

## 2020-09-22 PROCEDURE — 82330 ASSAY OF CALCIUM: CPT

## 2020-09-22 PROCEDURE — 80177 DRUG SCRN QUAN LEVETIRACETAM: CPT

## 2020-09-22 PROCEDURE — 70553 MRI BRAIN STEM W/O & W/DYE: CPT

## 2020-09-22 PROCEDURE — 96365 THER/PROPH/DIAG IV INF INIT: CPT

## 2020-09-22 PROCEDURE — 81001 URINALYSIS AUTO W/SCOPE: CPT

## 2020-09-22 PROCEDURE — C9254 INJECTION, LACOSAMIDE: HCPCS

## 2020-09-22 PROCEDURE — 84300 ASSAY OF URINE SODIUM: CPT

## 2020-09-22 PROCEDURE — 93010 ELECTROCARDIOGRAM REPORT: CPT

## 2020-09-22 PROCEDURE — 87040 BLOOD CULTURE FOR BACTERIA: CPT

## 2020-09-22 PROCEDURE — P9047 ALBUMIN (HUMAN), 25%, 50ML: HCPCS

## 2020-09-22 PROCEDURE — 84443 ASSAY THYROID STIM HORMONE: CPT

## 2020-09-22 PROCEDURE — 96366 THER/PROPH/DIAG IV INF ADDON: CPT

## 2020-09-22 PROCEDURE — 82570 ASSAY OF URINE CREATININE: CPT

## 2020-09-22 PROCEDURE — 83970 ASSAY OF PARATHORMONE: CPT

## 2020-09-22 PROCEDURE — A9576 INJ PROHANCE MULTIPACK: HCPCS

## 2020-09-22 PROCEDURE — 74177 CT ABD & PELVIS W/CONTRAST: CPT

## 2020-09-22 PROCEDURE — 70450 CT HEAD/BRAIN W/O DYE: CPT

## 2020-09-22 PROCEDURE — 70544 MR ANGIOGRAPHY HEAD W/O DYE: CPT

## 2020-09-22 PROCEDURE — 96367 TX/PROPH/DG ADDL SEQ IV INF: CPT

## 2020-09-22 PROCEDURE — 82962 GLUCOSE BLOOD TEST: CPT

## 2020-09-22 PROCEDURE — 83735 ASSAY OF MAGNESIUM: CPT

## 2020-09-22 RX ADMIN — LEVETIRACETAM SCH MLS/HR: 5 INJECTION INTRAVENOUS at 21:57

## 2020-09-22 RX ADMIN — FAMOTIDINE SCH MG: 20 TABLET, FILM COATED ORAL at 21:53

## 2020-09-22 NOTE — PDOC H&P
History of Present Illness


Admission Date/PCP: 


  09/22/20 20:08





  KUNAL BYRD MD





History of Present Illness: 


ELOISA BYRD is a 74 year old female past medical history of hypertension, 

hypothyroidism, chronic hyponatremia, anxiety, CLL and stage IV lung cancer with

known metastasis to brain status post chemoradiation, recently restarted on 

chemotherapy, currently on fifth cycle, last chemotherapy 2 weeks ago, who was 

sent to ED by her oncologist Dr. Snyder for evaluation of generalized weakness,

right upper and lower extremity weakness, altered mental status, and electrolyte

abnormalities.





Source of history is Dr. Byrd her son who is present in the room, as per son 

patient was at her baseline ambulating with the help of her  and p.o. 

tolerant, but recently patient has been noted to being more altered,having very 

low appetite with generalized weakness and right-sided upper and lower extremity

weakness and involuntary contractions.  Patient has not had any exposure to 

COVID, is not complaining of any nausea, vomiting, diarrhea, constipation, 

fever, chills, chest pain, shortness of breath.





Initially it was thought that her right upper and lower extremity weakness and 

altered mental status could be related to CVA or worsening metastatic brain 

lesion and was started on dexamethasone but on 9/21/2020 MRI/MRA head was 

negative for any acute stroke or any new lesions.  Patient also had a PET scan 

on 7/28/2020 comparison on 5/5/2020 which showed favorable response to therapy, 

increased metabolic activity confined to the primary lung mass.





Patient had outpatient CBC CMP which showed chronic hyponatremia, hypomagnesemia

and hypocalcemia, electrolytes were repleted and patient was sent home.  As 

patient did not improve much she was brought to ED today.





In ED she was noted to have afebrile, normotensive, and SPO2 of 100% on room 

air.  WBC of 39,000 up from 26271 on 2/28/2019. Sodium 126.0 with baseline of 

124-132 and elevated LFTs, otherwise lactic acid, TSH, calcium and magnesium all

within normal limits.





Patient does not have any urinary retention but when in ED a Cuellar patient was 

inserted she produced about 1700 cc of urine.  Urinalysis was negative for any 

infection.





On my encounter patient is comfortably resting in bed, in no apparent distress, 

very pleasant and cooperative with physical examination, she has visible right 

upper and lower extremity involuntary contraction,unfortunately does not 

communicate much due to language barrier, as per Dr. Byrd her son patient has 

improved since being admitted to ED.





Dr. Byrd her son who was present in the room also mentions that her mother CODE

STATUS is DNR/DNI.





I was able to talk to Dr. Snyder her oncologist who think patient will only 

need rehydration and correction of her electrolyte abnormalities and will not 

need any antibiotics and there is no sign infection even though she has elevated

leukocytosis which explained by her chronic CLL p.o. dexamethasone which was 

started recently. No further imaging recommended.





Past Medical History


Cardiac Medical History: Reports: Hypertension


   Denies: Myocardial Infarction


Pulmonary Medical History: 


   Denies: Asthma, Tuberculosis


Neurological Medical History: 


   Denies: Seizures


GI Medical History: 


   Denies: Hepatitis, Hiatal Hernia


Psychiatric Medical History: 


   Denies: Depression


Hematology: Reports: Anemia


   Denies: Sickle Cell Disease





Past Surgical History


Past Surgical History: 


   Denies: Amputation, Mastectomy, Pacemaker





Social History


Smoking Status: Unknown if Ever Smoked





Family History


Family History: Reviewed & Not Pertinent


Parental Family History Reviewed: Yes


Children Family History Reviewed: Yes


Sibling(s) Family History Reviewed.: Yes





Medication/Allergy


Home Medications: 








Atenolol [Tenormin 50 Mg Tablet] 50 mg PO DAILY 10/18/13 


Cholecalciferol (Vitamin D3) [Vitamin D3] 1,250 mcg PO MO@1000 09/22/20 


Dexamethasone [Decadron 4 Mg Tablet] 4 mg PO DAILY 09/22/20 


Esomeprazole Magnesium 40 mg PO DAILY 09/22/20 


Famotidine [Pepcid 20 mg Tablet] 20 mg PO QPM 09/22/20 


Folic Acid [Folvite 1 mg Tablet] 1 mg PO DAILY 09/22/20 


Levetiracetam [Keppra 500 mg Tablet] 500 mg PO Q12 09/22/20 


Levothyroxine Sodium [Synthroid 0.1 mg Tablet] 0.1 mg PO Q6AM 09/22/20 


Lorazepam [Ativan 0.5 mg Tablet] 0.5 mg PO Q8HP PRN 09/22/20 








Allergies/Adverse Reactions: 


                                        





Penicillins Allergy (Unknown, Verified 10/18/13 12:21)


   


Sulfa (Sulfonamide Antibiotics) Allergy (Unknown, Verified 10/18/13 12:21)


   











Review of Systems


Review of Systems: 


as per hpi





Physical Exam


Vital Signs: 


                                        











Temp Pulse Resp BP Pulse Ox


 


 98.2 F   89   22 H  177/81 H  100 


 


 09/22/20 21:11  09/22/20 21:11  09/22/20 21:11  09/22/20 21:11  09/22/20 21:11








                                 Intake & Output











 09/21/20 09/22/20 09/23/20





 06:59 06:59 06:59


 


Weight   38.5 kg











General appearance: PRESENT: no acute distress, thin


Head exam: PRESENT: atraumatic


Respiratory exam: PRESENT: clear to auscultation raymundo.  ABSENT: rales, rhonchi, 

wheezes


Cardiovascular exam: PRESENT: RRR.  ABSENT: diastolic murmur, rubs, systolic 

murmur


GI/Abdominal exam: PRESENT: normal bowel sounds, soft.  ABSENT: distended, 

guarding, mass, organolmegaly, rebound, tenderness


Gentrourinary exam: PRESENT: indwelling catheter


Neurological exam: PRESENT: alert, awake, CN II-XII grossly intact, motor 

sensory deficit - Right upper and lower extremity weakness. Involuntary right 

upper lower extremity contractures., other - Limited neuro examination due to 

language barrier and mental status.


Skin exam: PRESENT: dry, intact, warm, other - Right upper extremity ecc

hymosis..  ABSENT: cyanosis, rash





Results


Laboratory Results: 


                                        





                                 09/22/20 19:52 





                                 09/22/20 19:52 





                                        











  09/22/20 09/22/20 09/22/20





  19:52 19:52 19:52


 


WBC  39.0 H*  


 


RBC  3.47 L  


 


Hgb  10.2 L  


 


Hct  31.5 L  


 


MCV  91  


 


MCH  29.3  


 


MCHC  32.3  


 


RDW  16.3 H  


 


Plt Count  429  


 


Seg Neutrophils %  Not Reportable  


 


Sodium   126.0 L 


 


Potassium   4.8 


 


Chloride   96 L 


 


Carbon Dioxide   23 


 


Anion Gap   7 


 


BUN   19 


 


Creatinine   0.81 


 


Est GFR ( Amer)   > 60 


 


Glucose   130 H 


 


Lactic Acid    1.6


 


Calcium   8.8 


 


Magnesium   2.2 


 


Total Bilirubin   0.4 


 


AST   128 H 


 


Alkaline Phosphatase   338 H 


 


Total Protein   5.5 L 


 


Albumin   3.0 L 


 


TSH   


 


Urine Color   


 


Urine Appearance   


 


Urine pH   


 


Ur Specific Gravity   


 


Urine Protein   


 


Urine Glucose (UA)   


 


Urine Ketones   


 


Urine Blood   


 


Urine Nitrite   


 


Ur Leukocyte Esterase   


 


Urine WBC (Auto)   


 


Urine RBC (Auto)   














  09/22/20 09/22/20





  19:55 20:11


 


WBC  


 


RBC  


 


Hgb  


 


Hct  


 


MCV  


 


MCH  


 


MCHC  


 


RDW  


 


Plt Count  


 


Seg Neutrophils %  


 


Sodium  


 


Potassium  


 


Chloride  


 


Carbon Dioxide  


 


Anion Gap  


 


BUN  


 


Creatinine  


 


Est GFR (African Amer)  


 


Glucose  


 


Lactic Acid  


 


Calcium  


 


Magnesium  


 


Total Bilirubin  


 


AST  


 


Alkaline Phosphatase  


 


Total Protein  


 


Albumin  


 


TSH  1.05 


 


Urine Color   YELLOW


 


Urine Appearance   CLEAR


 


Urine pH   6.0


 


Ur Specific Kemmerer   1.011


 


Urine Protein   NEGATIVE


 


Urine Glucose (UA)   NEGATIVE


 


Urine Ketones   NEGATIVE


 


Urine Blood   NEGATIVE


 


Urine Nitrite   NEGATIVE


 


Ur Leukocyte Esterase   NEGATIVE


 


Urine WBC (Auto)   1


 


Urine RBC (Auto)   0














Assessment and Plan





- Diagnosis


(1) Hemiparesis


Qualifiers: 


   Hemiparesis etiology: unspecified   Hemiparesis laterality: right dominant 

side   Qualified Code(s): G81.91 - Hemiplegia, unspecified affecting right 

dominant side   


Is this a current diagnosis for this admission?: Yes   


Plan: 


With known metastatic brain lesions which as per MRA MRI on 9/21/2020 did not 

show any progression or new lesions.





 MRI MRA head negative for any acute stroke.





PET scan and 7/20/2020 stable compared to 5/2020.  Lesion localized in the lung.





Admit to telemetry, neurochecks, antiplatelets, OT ST PT.  Optimize BP, monitor 

vitals.








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


Qualifiers: 


   Laterality: unspecified laterality   Qualified Code(s): C34.90 - Malignant 

neoplasm of unspecified part of unspecified bronchus or lung   


Is this a current diagnosis for this admission?: Yes   


Plan: 


Recurrent.  Status post chemoradiation.  Currently on chemotherapy for cycle.  

Last chemotherapy 2 weeks ago. Followed by Dr. Snyder as outpatient.





Oncology consulted.  Follow-up recommendations.








(3) Hypertension


Is this a current diagnosis for this admission?: Yes   


Plan: 


Seems dehydrated.  Mildly hypertensive.


Continue fluid resuscitation guided by volume status.  Resume home meds.  PRN 

hydralazine.  Adjust meds as needed.








(4) Anorexia


Is this a current diagnosis for this admission?: Yes   


Plan: 


Likely related to underlying stage IV lung cancer.


Encourage p.o. intake, denies any dysphagia or odynophagia.


Consider appetite stimulant.  Consult registered dietitian.








(5) Chronic hyponatremia


Is this a current diagnosis for this admission?: Yes   


Plan: 


Chronic hyponatremia.  Likely due to underlying malignancy.


Sodium seems to be at baseline.  Continue normal saline.  Monitor sodium level. 

Monitor for seizure.








(6) Generalized weakness


Is this a current diagnosis for this admission?: Yes   


Plan: 


Due to above.  Plan as per above.








(7) Urinary retention


Is this a current diagnosis for this admission?: Yes   


Plan: 


This may partially explain her altered mental status, weakness and hyponatremia.

 


No history of urinary retention.  No back pain or back trauma.  No cauda equina 

symptoms.  UA negative.


Given history of malignancy spinal lesion is a possibility but given right upper

lower extremity hemiparesis spinal lesion is unlikely, if suspicious may 

consider CT or MRI of the spine.


Continue Cuellar care, monitor in and out, outpatient urology follow-up.








(8) History of chronic lymphocytic leukemia


Is this a current diagnosis for this admission?: Yes   


Plan: 


History of CLL.  Followed by Dr. Snyder as outpatient.  Presenting with 

leukocytosis but no sign of acute infection.  Vitals WNL.


Patient also started on dexamethasone 4 mg p.o. daily as outpatient which may 

also explain her worsening leukocytosis.





Blood culture.  No antibiotic indicated at this point, will start on empiric IV 

antibiotics guided by clinical symptoms and culture results.  








(9) Malnutrition


Qualifiers: 


   Malnutrition type: protein-calorie malnutrition   Protein-calorie 

malnutrition severity: moderate   Qualified Code(s): E44.0 - Moderate protein-

calorie malnutrition   


Is this a current diagnosis for this admission?: Yes   


Plan: 


BMI 16.1 kg/m.  Likely due to underlying malignancy and low p.o. intake.


Consider appetite stimulant, encourage p.o. intake.  Consult registered 

dietitian.








- Time


Time Spent with patient: 25-34 minutes


Medications reviewed and adjusted accordingly: Yes


Anticipated Discharge Disposition: Home with Home Health


Anticipated Discharge Timeframe: within 36 hours

## 2020-09-22 NOTE — ER DOCUMENT REPORT
ED General





- General


Stated Complaint: WEAKNESS


Time Seen by Provider: 09/22/20 19:35


TRAVEL OUTSIDE OF THE [place] IN LAST 30 DAYS: No





- HPI


Onset/Duration: Gradual


Quality of pain: No pain


Severity: Moderate


Context: 





74 year old female with known electrolyte abnormalities - hypocalcemia, 

hypomagnesemia - tremors, recent right hemipaesis, tremor of right arm and leg, 

stage 4 lung cancer with brain metastatic disease is here due to feeling poorly.

 Was eating well and functioning well until this am.  Pt is mother of Dr. CORIN Seay.  Dr Jun Peoples is oncologist and Dr. Dawson has graciously accepted the pt 

for further management.  No fever or travel or covid exposure and no chest pain 

or sob. 





- Related Data


Allergies/Adverse Reactions: 


                                        





Penicillins Allergy (Unknown, Verified 10/18/13 12:21)


   


Sulfa (Sulfonamide Antibiotics) Allergy (Unknown, Verified 10/18/13 12:21)


   











Past Medical History





- General


Information source: Patient, Relative





- Social History


Smoking Status: Unknown if Ever Smoked


Family History: Reviewed & Not Pertinent





- Past Medical History


Cardiac Medical History: Reports: Hx Hypertension


   Denies: Hx Heart Attack


Pulmonary Medical History: 


   Denies: Hx Asthma, Hx Tuberculosis


Neurological Medical History: Denies: Hx Cerebrovascular Accident, Hx Seizures


Renal/ Medical History: Denies: Hx Peritoneal Dialysis


GI Medical History: Denies: Hx Hepatitis, Hx Hiatal Hernia, Hx Ulcer


Infectious Medical History: Denies: Hx Hepatitis


Past Surgical History: Denies: Hx Mastectomy, Hx Open Heart Surgery, Hx 

Pacemaker





- Immunizations


Hx Diphtheria, Pertussis, Tetanus Vaccination: No





Review of Systems





- Review of Systems


Constitutional: Weakness


EENT: No symptoms reported


Cardiovascular: No symptoms reported


Respiratory: No symptoms reported


Gastrointestinal: No symptoms reported


Genitourinary: No symptoms reported


Female Genitourinary: No symptoms reported


Musculoskeletal: No symptoms reported


Skin: No symptoms reported


Hematologic/Lymphatic: No symptoms reported


Neurological/Psychological: See HPI, Tremor





Physical Exam





- Vital signs


Interpretation: Normal





- General


General appearance: Appears well, Alert





- HEENT


Head: Normocephalic, Atraumatic


Eyes: Normal


Pupils: PERRL





- Respiratory


Respiratory status: No respiratory distress


Chest status: Nontender


Breath sounds: Normal


Chest palpation: Normal





- Cardiovascular


Rhythm: Regular


Heart sounds: Normal auscultation


Murmur: No





- Abdominal


Inspection: Normal


Distension: No distension


Bowel sounds: Normal


Tenderness: Nontender


Organomegaly: No organomegaly





- Back


Back: Normal, Nontender





- Extremities


General upper extremity: Normal inspection, Nontender, Normal color, Normal ROM,

Normal temperature


General lower extremity: Normal inspection, Nontender, Normal color, Normal ROM,

Normal temperature, Normal weight bearing.  No: Abda's sign





- Neurological


Neuro grossly intact: Yes


Cognition: Normal


Orientation: AAOx4


Lolis Coma Scale Eye Opening: Spontaneous


Lolis Coma Scale Verbal: Oriented


Lolis Coma Scale Motor: Obeys Commands


Lolis Coma Scale Total: 15





- Psychological


Associated symptoms: Normal affect, Normal mood





- Skin


Skin Temperature: Warm


Skin Moisture: Dry


Skin Color: Normal





Course





- Re-evaluation


Re-evalutation: 





09/22/20 19:57


MDM  74 year old with right hemiparesis, known stage 4 lung cancer and weakness 

today.   Tremendous leukocytosis is noted and pt has been accepted graciously by

Dr. Dawson for admit. 








- Laboratory


Result Diagrams: 


                                 09/22/20 19:52





                                 09/22/20 19:52





Discharge





- Discharge


Clinical Impression: 


Hemiparesis


Qualifiers:


 Hemiparesis etiology: unspecified Hemiparesis laterality: right dominant side 

Qualified Code(s): G81.91 - Hemiplegia, unspecified affecting right dominant 

side





Leukocytosis, unspecified


Qualifiers:


 Leukocytosis type: unspecified Qualified Code(s): D72.829 - Elevated white 

blood cell count, unspecified





Lung cancer


Qualifiers:


 Laterality: left Lung location: unspecified part of lung Qualified Code(s): 

C34.92 - Malignant neoplasm of unspecified part of left bronchus or lung





Condition: Stable


Disposition: ADMITTED AS INPATIENT


Admitting Provider: Estella (Hospitalist)


Unit Admitted: Telemetry

## 2020-09-23 LAB
ABSOLUTE LYMPHOCYTES# (MANUAL): 7.7 10^3/UL (ref 0.5–4.7)
ABSOLUTE MONOCYTES # (MANUAL): 0 10^3/UL (ref 0.1–1.4)
ADD MANUAL DIFF: YES
ALBUMIN SERPL-MCNC: 2.5 G/DL (ref 3.5–5)
ALP SERPL-CCNC: 261 U/L (ref 38–126)
ANION GAP SERPL CALC-SCNC: 2 MMOL/L (ref 5–19)
ANISOCYTOSIS BLD QL SMEAR: (no result)
AST SERPL-CCNC: 64 U/L (ref 14–36)
BASOPHILS NFR BLD MANUAL: 0 % (ref 0–2)
BILIRUB DIRECT SERPL-MCNC: 0.3 MG/DL (ref 0–0.4)
BILIRUB SERPL-MCNC: 0.4 MG/DL (ref 0.2–1.3)
BUN SERPL-MCNC: 15 MG/DL (ref 7–20)
CALCIUM: 7.8 MG/DL (ref 8.4–10.2)
CHLORIDE SERPL-SCNC: 99 MMOL/L (ref 98–107)
CO2 SERPL-SCNC: 28 MMOL/L (ref 22–30)
CREAT UR-MCNC: 24 MG/DL (ref 15–278)
EOSINOPHIL NFR BLD MANUAL: 0 % (ref 0–6)
ERYTHROCYTE [DISTWIDTH] IN BLOOD BY AUTOMATED COUNT: 16.6 % (ref 11.5–14)
GLUCOSE SERPL-MCNC: 79 MG/DL (ref 75–110)
HCT VFR BLD CALC: 29.3 % (ref 36–47)
HGB BLD-MCNC: 9.6 G/DL (ref 12–15.5)
MCH RBC QN AUTO: 29.7 PG (ref 27–33.4)
MCHC RBC AUTO-ENTMCNC: 32.7 G/DL (ref 32–36)
MCV RBC AUTO: 91 FL (ref 80–97)
MONOCYTES % (MANUAL): 0 % (ref 3–13)
NRBC BLD AUTO-RTO: 1 /100 WBC
OVALOCYTES BLD QL SMEAR: SLIGHT
PATH REV BLD -IMP: (no result)
PHOSPHATE SERPL-MCNC: 3.4 MG/DL (ref 2.5–4.5)
PLATELET # BLD: 378 10^3/UL (ref 150–450)
PLATELET COMMENT: (no result)
POIKILOCYTOSIS BLD QL SMEAR: SLIGHT
POLYCHROMASIA BLD QL SMEAR: SLIGHT
POTASSIUM SERPL-SCNC: 4.8 MMOL/L (ref 3.6–5)
PROT SERPL-MCNC: 4.7 G/DL (ref 6.3–8.2)
RBC # BLD AUTO: 3.23 10^6/UL (ref 3.72–5.28)
SEGMENTED NEUTROPHILS % (MAN): 72 % (ref 42–78)
TOTAL CELLS COUNTED BLD: 100
URINE SODIUM: 182 MMOL/L (ref 30–90)
VARIANT LYMPHS NFR BLD MANUAL: 28 % (ref 13–45)
WBC # BLD AUTO: 27.4 10^3/UL (ref 4–10.5)

## 2020-09-23 RX ADMIN — PANTOPRAZOLE SODIUM SCH: 40 INJECTION, POWDER, FOR SOLUTION INTRAVENOUS at 16:20

## 2020-09-23 RX ADMIN — DEXAMETHASONE SODIUM PHOSPHATE SCH MG: 4 INJECTION, SOLUTION INTRAMUSCULAR; INTRAVENOUS at 16:05

## 2020-09-23 RX ADMIN — DEXAMETHASONE SODIUM PHOSPHATE SCH MG: 4 INJECTION, SOLUTION INTRAMUSCULAR; INTRAVENOUS at 18:42

## 2020-09-23 RX ADMIN — LEVETIRACETAM SCH MLS/HR: 5 INJECTION INTRAVENOUS at 11:32

## 2020-09-23 RX ADMIN — LEVETIRACETAM SCH MLS/HR: 10 INJECTION INTRAVENOUS at 22:55

## 2020-09-23 RX ADMIN — DEXAMETHASONE SODIUM PHOSPHATE SCH: 4 INJECTION, SOLUTION INTRAMUSCULAR; INTRAVENOUS at 23:31

## 2020-09-23 RX ADMIN — PHENYTOIN SODIUM ONE: 50 INJECTION INTRAMUSCULAR; INTRAVENOUS at 16:19

## 2020-09-23 RX ADMIN — FAMOTIDINE SCH: 20 TABLET, FILM COATED ORAL at 11:33

## 2020-09-23 RX ADMIN — SODIUM CHLORIDE PRN MLS/HR: 9 INJECTION, SOLUTION INTRAVENOUS at 06:54

## 2020-09-23 RX ADMIN — CEFEPIME HYDROCHLORIDE SCH MLS/HR: 1 INJECTION, SOLUTION INTRAVENOUS at 20:28

## 2020-09-23 RX ADMIN — PHENYTOIN SODIUM ONE MG: 50 INJECTION INTRAMUSCULAR; INTRAVENOUS at 11:47

## 2020-09-23 RX ADMIN — PANTOPRAZOLE SODIUM SCH MG: 40 INJECTION, POWDER, FOR SOLUTION INTRAVENOUS at 22:55

## 2020-09-23 RX ADMIN — ATENOLOL SCH MG: 50 TABLET ORAL at 11:33

## 2020-09-23 RX ADMIN — ATENOLOL SCH: 50 TABLET ORAL at 02:40

## 2020-09-23 RX ADMIN — FOLIC ACID SCH MG: 1 TABLET ORAL at 11:33

## 2020-09-23 NOTE — RADIOLOGY REPORT (SQ)
EXAM DESCRIPTION:  CT HEAD WITHOUT



IMAGES COMPLETED DATE/TIME:  9/23/2020 10:56 am



REASON FOR STUDY:  Altered Mental Status C34.31  MALIGNANT NEOPLASM OF LOWER LOBE, RIGHT BRONCHUS OR 
L C34.30  MALIGNANT NEOPLASM OF LOWER LOBE, UNSP BRONCHUS OR TJ



COMPARISON:  MRI dated 9/21/2020



TECHNIQUE:  Axial images acquired through the brain without intravenous contrast.  Images reviewed wi
th bone, brain and subdural windows.  Additional sagittal and coronal reconstructions were generated.
 Images stored on PACS.

All CT scanners at this facility use dose modulation, iterative reconstruction, and/or weight based d
osing when appropriate to reduce radiation dose to as low as reasonably achievable (ALARA).

[facility]: Dose Right  [facility]: CareDose    [facility]: Dose Right    [facility]: Teradose 4D    [facility]: Smart Technologies



RADIATION DOSE:  CT Rad equipment meets quality standard of care and radiation dose reduction techniq
ues were employed. CTDIvol: 48.6 mGy. DLP: 881 mGy-cm. mGy.



LIMITATIONS:  None.



FINDINGS:  VENTRICLES: Prominent.

CEREBRUM: Intracranial metastasis well demonstrated on recent MRI is less apparent on CT.  There is f
ocal decreased attenuation in the high left posterior frontal anterior parietal region consistent wit
h vasogenic edema.  Focal decreased attenuation in the left frontal lobe is also noted corresponding 
to metastatic focus.   No hemorrhage.  No midline shift.  Areas of low density in the white matter mo
st likely due to chronic micro-vascular ischemic change.  No evidence for acute infarction.

CEREBELLUM: No masses.  No hemorrhage.  No alteration of density.  No evidence for acute infarction.

EXTRAAXIAL SPACES: Mild age-related involutional change.  No fluid collections.  No masses.

ORBITS AND GLOBE: No intra- or extraconal masses.  Normal contour of globe without masses.

CALVARIUM: No fracture.

PARANASAL SINUSES: No fluid or mucosal thickening.

SOFT TISSUES: No mass or hematoma.

OTHER: No other significant finding.



IMPRESSION:  1.  Small metastatic foci in the left cerebral hemisphere are again noted but less appar
ent on the current study.  This in part is due to lack of IV contrast.

2.  No intracranial hemorrhage.  No midline shift.

EVIDENCE OF ACUTE STROKE: NO.



TECHNICAL DOCUMENTATION:  JOB ID:  [id number]

Quality ID # [id number]: Final reports with documentation of one or more dose reduction techniques (e.g., Au
tomated exposure control, adjustment of the mA and/or kV according to patient size, use of iterative 
reconstruction technique)

 2011 [employer]- All Rights Reserved



Reading location - IP/workstation name: [email or web address]

## 2020-09-23 NOTE — NEURO WORKBENCH EEG REPORT
EEG Report



Patient:  Tash Seay

ID: [id number]

Referring Doctor: Aly Chavarria

Date: 09/23/2020

Reason for study: Evaluate Epileptiform activity



Medications: Decadron, Lovenox, Folvite, Lacosamide, Keppra, Protonix



History:

This is a 74 year old female with a history of anorexia, urinary retention, 
thyroid disease, right side paralysis, HTN, lung cancer with mets to the brain, 
GERD, anemia, and altered mental status. This EEG was requested for evaluation 
of epileptiform activity.



EEG Interpretation



This EEG shows electroclinical status-epilepticus. There were spontaneous eye 
openings and closings. 

The EEG is dominated by continuous approximately 0.5- 2.0 Hz Generalized 
Periodic Discharges (GPDs). The clinical correlation to these GPDs on video 
shows time-locked right shoulder twitching. Excluding the GPDs, the remainder of
the background EEG shows diffuse polymorphic delta-theta activity. There is no 
posterior dominant rhythm. There were no obvious asymmetries in amplitude or 
frequencies. Photic stimulation was done and no photic driving was not noted. 
There was no normal sleep architecture present (no vertex waves, K-complexes, 
POSTs, or sleep spindles). This EEG is consistent with electroclinical status-
epilepticus due to the constant GPDs with clinical correlation. 



There was persistent artifact in the EKG trace prohibiting meaningful 
interpretation. I cannot discern if there is a regular or irregular rhythm based
upon this procedure. 





EEG Impression

This is a markedly abnormal EEG and consistent with electroclinical status-
epilepticus. Generalized Periodic Discharges (GPDs) are commonly seen in diffuse
encephalopathy from various etiologies including (but not limited to) hypoxic-
ischemic injury, drug intoxication, metabolic derangements (such as hepatic or 
renal encephalopathy), central nervous system infections, and as the terminal 
EEG sign of generalized convulsive status epilepticus. Structural cerebral 
abnormalities are also in the differential. In this EEG, the GPDs appear most 
consistent and diagnostic of electroclinical status-epilepticus. However, 
clinical correlation with the above underlying possible etiologies is advised. 
MRI of the brain is warranted as possible. Treatment for status-epilepticus 
under the typical treatment regimen is warranted including Dilantin and/or 
Depakote. It is understood that continuous EEG monitoring (although warranted) 
is not possible at this facility. Follow-up EEG after appropriate medical 
management is advised.



These findings were discussed with on the phone with Dr. Aly Chavarria at 
17:53 eastern time.



INTERPRETING NEUROLOGIST:  

Adriano Moser MD

Board certified by the American Academy of Neu[employer] and Psychiatry in 
Neurology, Clinical Neurophysiology, and Sleep Medicine

[facility] Center[facility]NETH

## 2020-09-23 NOTE — RADIOLOGY REPORT (SQ)
EXAM DESCRIPTION:  CT CHEST WITH; CT ABD/PELVIS WITH IV ONLY



IMAGES COMPLETED DATE/TIME:  9/23/2020 11:00 am



REASON FOR STUDY:  stage 4 lung cancer  C34.31  MALIGNANT NEOPLASM OF LOWER LOBE, RIGHT BRONCHUS OR L
 C34.30  MALIGNANT NEOPLASM OF LOWER LOBE, UNSP BRONCHUS OR TJ



CONTRAST TYPE AND DOSE:  contrast/concentration: Isovue 350.00 mmol/ml; Total Contrast Delivered: 45.
0 ml; Total Saline Delivered: 30.0 ml



RENAL FUNCTION:  BUN 15, creatinine 0.72



COMPARISON:  PET-CT dated 7/28/2020



TECHNIQUE:  CT scan of the chest performed using helical scanning technique with dynamic intravenous 
contrast injection.  Images reviewed with lung, soft tissue and bone windows. Reconstructed coronal a
nd sagittal MPR images reviewed.  All images stored on PACS.

All CT scanners at this facility use dose modulation, iterative reconstruction, and/or weight based d
osing when appropriate to reduce radiation dose to as low as reasonably achievable (ALARA).

[facility]: Dose Right  [facility]: CareDose    [facility]: Dose Right    [facility]: Teradose 4D    [facility]: Smart Technologies



RADIATION DOSE:  CT Rad equipment meets quality standard of care and radiation dose reduction techniq
ues were employed. CTDIvol: 4.4 - 4.5 mGy. DLP: 586 mGy-cm. .



LIMITATIONS:  None.



FINDINGS:  AXILLAE: No adenopathy.

CHEST WALL: No masses.  No subcutaneous air.

LUNGS: Persistent soft tissue attenuation in the left hilum.  This is not significantly changed from 
recent PET-CT.  There is complete collapse of the left lower lobe which is a new finding.  This eithe
r suggest progression of disease with postobstructive atelectasis or mucous plug.  There is a moderat
e left pleural effusion which is increased since prior PET-CT.

PLEURA: Moderate effusion as discussed above.

THYROID: No masses or significant asymmetry.

HILAR AND MEDIASTINAL STRUCTURES: Left hilar fullness.  Study is degraded by respiratory motion.  Nila
pect left hilar mass as previously demonstrated.

AORTA AND GREAT VESSELS: No aneurysm.  No dissection.

PULMONARY ARTERIES: No identified pulmonary emboli.  Study not optimized for the pulmonary arteries.

HEART: No pericardial effusion.

HARDWARE AND LIFELINES: None.

BONES: No significant finding.

OTHER: Moderate size hiatal hernia.



IMPRESSION:  Increasing left pleural effusion.  Complete collapse of left lower lobe as described.



COMPARISON:  None.



RADIATION DOSE:  CT Rad equipment meets quality standard of care and radiation dose reduction techniq
ues were employed. CTDIvol: 4.4 - 4.5 mGy. DLP: 586 mGy-cm. mGy.



TECHNIQUE:  CT scan of the abdomen and pelvis performed with intravenous and oral contrast using wilbert
meliton scanning technique with dynamic intravenous contrast injection.  Images reviewed with lung, soft 
tissue and bone windows.  Reconstructed coronal and sagittal MPR images reviewed.  Delayed images for
 evaluation of the urinary system also acquired and evaluated. All images stored on PACS.

All CT scanners at this facility use dose modulation, iterative reconstruction, and/or weight based d
osing when appropriate to reduce radiation dose to as low as reasonably achievable (ALARA).

[facility]: Dose Right  [facility]: SureCare    [facility]: Dose Right    [facility]: Teradose 4D    [facility]: Smart Technologies



FINDINGS:  LIVER: Normal size. No masses.  No dilated ducts.

SPLEEN: Normal size.  No focal lesions.

PANCREAS: Atrophic pancreas.  No masses.

GALLBLADDER: Mildly distended gallbladder.  No surrounding edema.

ADRENAL GLANDS: No significant masses or asymmetry.

RIGHT KIDNEY AND URETER: No solid masses.   No significant calcifications.   No hydronephrosis or hyd
roureter.

LEFT KIDNEY AND URETER: No solid masses.  Simple left renal cyst.   No significant calcifications.   
No hydronephrosis or hydroureter.

AORTA AND VESSELS: Atherosclerotic change.  No aneurysmal dilatation.

RETROPERITONEUM: No retroperitoneal adenopathy, hemorrhage or masses.

LARGE AND SMALL BOWEL: No dilatation.  No masses.  No wall thickening.

APPENDIX: Prior appendectomy.

ABDOMINAL WALL: No hernia or masses.

PERITONEAL CAVITY: No free air.  No free fluid.  No peritoneal implants or masses.

PELVIS: Cuellar catheter decompresses the bladder.

BONES: No significant or acute findings.

OTHER: No other significant finding.



IMPRESSION:  No acute findings in the abdomen or pelvis.  The study is degraded by respiratory motion
.



TECHNICAL DOCUMENTATION:  JOB ID:  [id number]

Quality ID # [id number]: Final reports with documentation of one or more dose reduction techniques (e.g., Au
tomated exposure control, adjustment of the mA and/or kV according to patient size, use of iterative 
reconstruction technique)

 2011 [employer]- All Rights Reserved



Reading location - IP/workstation name: [email or web address]

## 2020-09-23 NOTE — EKG REPORT
SEVERITY:- ABNORMAL ECG -

SINUS RHYTHM

RIGHT AXIS DEVIATION

LOW VOLTAGE IN FRONTAL LEADS

NONSPECIFIC T ABNORMALITIES, ANT-LAT LEADS

BORDERLINE PROLONGED QT INTERVAL

:

Confirmed by: Daiana Wylie MD 23-Sep-2020 14:43:40

## 2020-09-23 NOTE — PDOC PROGRESS REPORT
Subjective


Progress Note for:: 09/23/20


Subjective:: 





74 year old female past medical history of hypertension, hypothyroidism, chronic

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 

brain status post chemoradiation, recently restarted on chemotherapy, currently 

on fifth cycle, last chemotherapy 2 weeks ago, who was sent to ED by her 

oncologist Dr. Snyder for evaluation of generalized weakness, right upper and 

lower extremity weakness, altered mental status, and electrolyte abnormalities.





Source of history is Dr. Seay her son who is present in the room, as per son 

patient was at her baseline ambulating with the help of her  and p.o. 

tolerant, but recently patient has been noted to being more altered,having very 

low appetite with generalized weakness and right-sided upper and lower extremity

weakness and involuntary contractions.  Patient has not had any exposure to COVI

D, is not complaining of any nausea, vomiting, diarrhea, constipation, fever, 

chills, chest pain, shortness of breath.





Initially it was thought that her right upper and lower extremity weakness and a

ltered mental status could be related to CVA or worsening metastatic brain 

lesion and was started on dexamethasone but on 9/21/2020 MRI/MRA head was 

negative for any acute stroke or any new lesions.  Patient also had a PET scan 

on 7/28/2020 comparison on 5/5/2020 which showed favorable response to therapy, 

increased metabolic activity confined to the primary lung mass.





Patient had outpatient CBC CMP which showed chronic hyponatremia, hypomagnesemia

and hypocalcemia, electrolytes were repleted and patient was sent home.  As 

patient did not improve much she was brought to ED today.





In ED she was noted to have afebrile, normotensive, and SPO2 of 100% on room 

air.  WBC of 39,000 up from 96695 on 2/28/2019. Sodium 126.0 with baseline of 

124-132 and elevated LFTs, otherwise lactic acid, TSH, calcium and magnesium all

within normal limits.





Patient does not have any urinary retention but when in ED a Ceullar patient was 

inserted she produced about 1700 cc of urine.  Urinalysis was negative for any 

infection.





On my encounter patient is comfortably resting in bed, in no apparent distress, 

very pleasant and cooperative with physical examination, she has visible right 

upper and lower extremity involuntary contraction,unfortunately does not 

communicate much due to language barrier, as per Dr. eSay her son patient has 

improved since being admitted to ED.





Dr. Seay her son who was present in the room also mentions that her mother CODE

STATUS is DNR/DNI.





I was able to talk to Dr. Snyder her oncologist who think patient will only 

need rehydration and correction of her electrolyte abnormalities and will not 

need any antibiotics and there is no sign infection even though she has elevated

leukocytosis which explained by her chronic CLL p.o. dexamethasone which was 

started recently. No further imaging recommended.








9/23/20-CT head is negative.  Recent MRI/MRA negative for stroke.  On 

examination patient is having the seizure-like activity affecting the right side

of the body.  Not responding to verbal commands.  Right gaze is fixed.  Patient 

received Keppra IV push, Dilantin IV push, lorazepam as needed to start her on 

Vimpat from this evening.  EEG was requested.  Patient will be n.p.o.  Started 

on IV hydralazine 10 mg every 4 hours as needed for systolic blood pressure more

than 170.  Keep her n.p.o. started on IV Protonix 40 mg twice a day.  

aspiration, seizure precautions will be requested.


Reason For Visit: 


ANORREXIA,WEAKNESS,URINARY RETENTION,AMS








Physical Exam


Vital Signs: 


                                        











Temp Pulse Resp BP Pulse Ox


 


 98.7 F   109 H  18   184/92 H  98 


 


 09/23/20 08:00  09/23/20 08:00  09/23/20 08:00  09/23/20 08:00  09/23/20 08:00








                                 Intake & Output











 09/22/20 09/23/20 09/24/20





 06:59 06:59 06:59


 


Intake Total  100 


 


Balance  100 


 


Weight  38.6 kg 











General appearance: PRESENT: no acute distress, thin, other - Patient is not 

responding to the verbal commands.


Head exam: PRESENT: atraumatic


Eye exam: PRESENT: other - Fixed right gaze.


Mouth exam: PRESENT: moist, tongue midline


Teeth exam: PRESENT: poor dentation


Neck exam: ABSENT: carotid bruit, JVD, lymphadenopathy, thyromegaly


Respiratory exam: PRESENT: decreased breath sounds


Cardiovascular exam: PRESENT: tachycardia


GI/Abdominal exam: PRESENT: normal bowel sounds, soft.  ABSENT: distended, 

guarding, mass, organolmegaly, rebound, tenderness


Rectal exam: PRESENT: deferred


Extremities exam: PRESENT: full ROM.  ABSENT: calf tenderness, clubbing, pedal 

edema


Neurological exam: PRESENT: other - Unresponsive patient has seizure-like 

activity.





Results


Laboratory Results: 


                                        





                                 09/23/20 06:50 





                                 09/23/20 06:50 





                                        











  09/22/20 09/22/20 09/22/20





  19:52 19:52 19:52


 


WBC  39.0 H*  


 


RBC  3.47 L  


 


Hgb  10.2 L  


 


Hct  31.5 L  


 


MCV  91  


 


MCH  29.3  


 


MCHC  32.3  


 


RDW  16.3 H  


 


Plt Count  429  


 


Seg Neutrophils %  Not Reportable  


 


Sodium   126.0 L 


 


Potassium   4.8 


 


Chloride   96 L 


 


Carbon Dioxide   23 


 


Anion Gap   7 


 


BUN   19 


 


Creatinine   0.81 


 


Est GFR ( Amer)   > 60 


 


Glucose   130 H 


 


Lactic Acid    1.6


 


Calcium   8.8 


 


Ionized Calcium Angela   


 


Phosphorus   


 


Magnesium   2.2 


 


Total Bilirubin   0.4 


 


AST   128 H 


 


Alkaline Phosphatase   338 H 


 


Ammonia   


 


Total Protein   5.5 L 


 


Albumin   3.0 L 


 


TSH   


 


Urine Color   


 


Urine Appearance   


 


Urine pH   


 


Ur Specific Gravity   


 


Urine Protein   


 


Urine Glucose (UA)   


 


Urine Ketones   


 


Urine Blood   


 


Urine Nitrite   


 


Ur Leukocyte Esterase   


 


Urine WBC (Auto)   


 


Urine RBC (Auto)   














  09/22/20 09/22/20 09/22/20





  19:55 20:11 22:06


 


WBC   


 


RBC   


 


Hgb   


 


Hct   


 


MCV   


 


MCH   


 


MCHC   


 


RDW   


 


Plt Count   


 


Seg Neutrophils %   


 


Sodium   


 


Potassium   


 


Chloride   


 


Carbon Dioxide   


 


Anion Gap   


 


BUN   


 


Creatinine   


 


Est GFR (African Amer)   


 


Glucose   


 


Lactic Acid   


 


Calcium   


 


Ionized Calcium Angela    1.15


 


Phosphorus   


 


Magnesium   


 


Total Bilirubin   


 


AST   


 


Alkaline Phosphatase   


 


Ammonia   


 


Total Protein   


 


Albumin   


 


TSH  1.05  


 


Urine Color   YELLOW 


 


Urine Appearance   CLEAR 


 


Urine pH   6.0 


 


Ur Specific [place]   1.011 


 


Urine Protein   NEGATIVE 


 


Urine Glucose (UA)   NEGATIVE 


 


Urine Ketones   NEGATIVE 


 


Urine Blood   NEGATIVE 


 


Urine Nitrite   NEGATIVE 


 


Ur Leukocyte Esterase   NEGATIVE 


 


Urine WBC (Auto)   1 


 


Urine RBC (Auto)   0 














  09/23/20 09/23/20 09/23/20





  06:50 06:50 06:50


 


WBC   27.4 H 


 


RBC   3.23 L 


 


Hgb   9.6 L 


 


Hct   29.3 L 


 


MCV   91 


 


MCH   29.7 


 


MCHC   32.7 


 


RDW   16.6 H 


 


Plt Count   378 


 


Seg Neutrophils %   Not Reportable 


 


Sodium    128.9 L


 


Potassium    4.8


 


Chloride    99


 


Carbon Dioxide    28


 


Anion Gap    2 L


 


BUN    15


 


Creatinine    0.72


 


Est GFR ( Amer)    > 60


 


Glucose    79


 


Lactic Acid   


 


Calcium    7.8 L


 


Ionized Calcium Angela   


 


Phosphorus    3.4


 


Magnesium    1.7


 


Total Bilirubin    0.4


 


AST    64 H


 


Alkaline Phosphatase    261 H


 


Ammonia  < 8.7 L  


 


Total Protein    4.7 L


 


Albumin    2.5 L


 


TSH   


 


Urine Color   


 


Urine Appearance   


 


Urine pH   


 


Ur Specific Gravity   


 


Urine Protein   


 


Urine Glucose (UA)   


 


Urine Ketones   


 


Urine Blood   


 


Urine Nitrite   


 


Ur Leukocyte Esterase   


 


Urine WBC (Auto)   


 


Urine RBC (Auto)   











Impressions: 


                                        





Head CT  09/23/20 00:00


IMPRESSION:  1.  Small metastatic foci in the left cerebral hemisphere are again

noted but less apparent on the current study.  This in part is due to lack of IV

contrast.


2.  No intracranial hemorrhage.  No midline shift.


EVIDENCE OF ACUTE STROKE: NO.


 














Assessment and Plan





- Diagnosis


(1) Seizure disorder


Is this a current diagnosis for this admission?: Yes   


Plan: 


9/23/2020-patient having the active continuous seizure activity received IV 

Dilantin, IV Keppra, also on IV lorazepam as needed.  Plan is to start Vimpat 

from evening.  EEG was requested.  Aspiration, seizure precautions will be 

requested.








(2) Chronic hyponatremia


Is this a current diagnosis for this admission?: Yes   


Plan: 


Chronic hyponatremia.  Likely due to underlying malignancy.


Sodium seems to be at baseline.  Continue normal saline.  Monitor sodium level. 

Monitor for seizure.








9/23/2020-serum sodium is 128.9.  Chronic hyponatremia most likely secondary to 

underlying malignancy.








(3) Hypertension


Is this a current diagnosis for this admission?: Yes   


Plan: 


Seems dehydrated.  Mildly hypertensive.


Continue fluid resuscitation guided by volume status.  Resume home meds.  PRN 

hydralazine.  Adjust meds as needed.








9/23/2020-patient has history of chronic essential hypertension systolic blood 

pressure close to 200.  Started on IV hydralazine 10 mg every 4 PRN for systolic

blood pressure more than 170.  It may be a contributing factor for seizure.








(4) Hypocalcemia


Is this a current diagnosis for this admission?: Yes   


Plan: 


9/23/2020-corrected serum calcium is within normal limits.








(5) Hypomagnesemia


Is this a current diagnosis for this admission?: Yes   


Plan: 


9/23/2020-serum magnesium is 1.7 to give 1 g of IV magnesium at this time.








(6) History of chronic lymphocytic leukemia


Is this a current diagnosis for this admission?: Yes   


Plan: 


History of CLL.  Followed by Dr. Snyder as outpatient.  Presenting with 

leukocytosis but no sign of acute infection.  Vitals WNL.


Patient also started on dexamethasone 4 mg p.o. daily as outpatient which may 

also explain her worsening leukocytosis.





Blood culture.  No antibiotic indicated at this point, will start on empiric IV 

antibiotics guided by clinical symptoms and culture results.  








9/23/2020-patient has history of CLL.  Dr. Snyder on board.  WBC count is 

elevated most likely secondary to CLL.  Afebrile.  Not on antibiotics at this 

time.








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


Qualifiers: 


   Laterality: unspecified laterality   Qualified Code(s): C34.90 - Malignant n

eoplasm of unspecified part of unspecified bronchus or lung   


Is this a current diagnosis for this admission?: Yes   


Plan: 


Recurrent.  Status post chemoradiation.  Currently on chemotherapy for cycle.  

Last chemotherapy 2 weeks ago. Followed by Dr. Snyder as outpatient.





Oncology consulted.  Follow-up recommendations.








(8) Protein-energy malnutrition


Qualifiers: 


   Protein-calorie malnutrition severity: severe   Qualified Code(s): E43 - 

Unspecified severe protein-calorie malnutrition   


Is this a current diagnosis for this admission?: Yes   


Plan: 


9/23/2020-patient BMI is less than 17.  On examination wasting of the temporalis

muscles, quadriceps muscles, deltoid muscle seen.  Patient is n.p.o. at this 

time.  Dietary consult is requested.








- Time


Anticipated Discharge Disposition: Home with Home Health


Anticipated Discharge Timeframe: within 72 hours

## 2020-09-23 NOTE — PDOC CONSULTATION
Consultation


Consult Date: 09/23/20


Provider Consulted: RAHUL SELF


Consult reason:: hyponatremia, hypocalcemia, hypomagnesemia.





History of Present Illness


Admission Date/PCP: 


  09/22/20 20:08





  KUNAL SEAY MD





History of Present Illness: 


ELOISA SEAY is a 74 year old female with significant past medical history of 

hypertension, hypothyroidism, chronic hyponatremia, anxiety, CLL and stage IV 

lung cancer with known metastasis to brain status post chemoradiation, recently 

restarted on chemotherapy, currently on fifth cycle, last chemotherapy 2 weeks 

ago, who was sent to ED by her oncologist Dr. Snyder for evaluation of 

generalized weakness, right upper and lower extremity weakness, altered mental 

status, and electrolyte abnormalities.





I am seeing the patient today by the bedside. Source of history is Dr. Seay her

son who is present in the room, as per son patient was at her baseline 

ambulating with the help of her  and p.o. tolerant, but recently patient 

has been noted to being more altered,having very low appetite with generalized 

weakness and right-sided upper and lower extremity weakness and involuntary 

contractions.  Patient has not had any exposure to COVID, is not complaining of 

any nausea, vomiting, diarrhea, constipation, fever, chills, chest pain, 

shortness of breath.





Initially it was thought that her right upper and lower extremity weakness and 

altered mental status could be related to CVA or worsening metastatic brain 

lesion and was started on dexamethasone but on 9/21/2020 MRI/MRA head was 

negative for any acute stroke or any new lesions.  Patient also had a PET scan 

on 7/28/2020 comparison on 5/5/2020 which showed favorable response to therapy, 

increased metabolic activity confined to the primary lung mass.





Patient had outpatient CBC CMP which showed acute on chronic hyponatremia, 

hypomagnesemia and hypocalcemia. Her calcium and magnesium deficiency  were 

repleted as OP therapy and patient was sent home. The patient showed some 

improvement obviously in her neuropathy symptoms but she continued to have jerky

movements of her right side and later became rather somnolent and hypo 

responsive and therefore she was brought to the ER yesterday .





In ED she was noted to have afebrile, normotensive, and SPO2 of 100% on room 

air.  WBC of 39,000 up from 08058 on 2/28/2019. Sodium 126.0 with baseline of 

124-132 and elevated LFTs. Her TSH, calcium and magnesium were all within normal

limits. Patient does not have any urinary retention but when in ED a Cuellar 

patient was inserted she produced about 1700 cc of urine.  Urinalysis was 

negative for any infection.





Today as I see her she is rather moribund and rather unresponsive.  She is dev

iated to her right side which is floppy.  She has a right gaze which is fixated.

 She has continues tonic movements of right lower extremity.








Past Medical History


Cardiac Medical History: 


   Denies: Myocardial Infarction


Pulmonary Medical History: 


   Denies: Asthma, Tuberculosis


Neurological Medical History: 


   Denies: Seizures


Renal/ Medical History: Reports: Hyponatremia


Malignancy Medical History: Reports: Lung Cancer, Other - CLL


GI Medical History: 


   Denies: Hepatitis, Hiatal Hernia


Psychiatric Medical History: 


   Denies: Depression





Past Surgical History


Past Surgical History: 


   Denies: Mastectomy, Pacemaker





Social History


Lives with: Family


Smoking Status: Never Smoker


Electronic Cigarette use?: No


Frequency of Alcohol Use: None


Hx Recreational Drug Use: No


Hx Prescription Drug Abuse: No





- Advance Directive


Resuscitation Status: Do Not Resuscitate





Family History


Parental Family History Reviewed: Yes - Negative for any CKD


Children Family History Reviewed: No


Sibling(s) Family History Reviewed.: No





Medication/Allergy


Home Medications: 








Atenolol [Tenormin 50 Mg Tablet] 50 mg PO DAILY 10/18/13 


Cholecalciferol (Vitamin D3) [Vitamin D3] 1,250 mcg PO MO@1000 09/22/20 


Dexamethasone [Decadron 4 Mg Tablet] 4 mg PO DAILY 09/22/20 


Esomeprazole Magnesium 40 mg PO DAILY 09/22/20 


Famotidine [Pepcid 20 mg Tablet] 20 mg PO QPM 09/22/20 


Folic Acid [Folvite 1 mg Tablet] 1 mg PO DAILY 09/22/20 


Levetiracetam [Keppra 500 mg Tablet] 500 mg PO Q12 09/22/20 


Levothyroxine Sodium [Synthroid 0.1 mg Tablet] 0.1 mg PO Q6AM 09/22/20 


Lorazepam [Ativan 0.5 mg Tablet] 0.5 mg PO Q8HP PRN 09/22/20 








Allergies/Adverse Reactions: 


                                        





Penicillins Allergy (Unknown, Verified 10/18/13 12:21)


   


Sulfa (Sulfonamide Antibiotics) Allergy (Unknown, Verified 10/18/13 12:21)


   











Review of Systems


ROS unobtainable: Due to mental status


Review of Systems: 


However chart review was done and discussions were done with her son Dr. Seay.








Physical Exam


Vital Signs: 


                                        











Temp Pulse Resp BP Pulse Ox


 


 98.7 F   109 H  18   184/92 H  98 


 


 09/23/20 08:00  09/23/20 08:00  09/23/20 08:00  09/23/20 08:00  09/23/20 08:00








                                 Intake & Output











 09/22/20 09/23/20 09/24/20





 06:59 06:59 06:59


 


Intake Total  100 


 


Balance  100 


 


Weight  38.6 kg 











General appearance: PRESENT: disheveled, mild distress, thin


Eye exam: PRESENT: EOMI, PERRLA.  ABSENT: scleral icterus


Ear exam: PRESENT: normal external ear exam


Mouth exam: PRESENT: neck supple.  ABSENT: moist


Neck exam: ABSENT: lymphadenopathy, meningismus, tenderness, thyromegaly, 

tracheal deviation


Respiratory exam: PRESENT: clear to auscultation raymundo, crackles, decreased breath

sounds


Cardiovascular exam: PRESENT: +S1, +S2.  ABSENT: rubs


GI/Abdominal exam: PRESENT: normal bowel sounds, soft.  ABSENT: organomegaly, 

tenderness


Extremities exam: ABSENT: pedal edema


Neurological exam: PRESENT: altered


Skin exam: ABSENT: erythema, mottled, rash





Results


Laboratory Results: 


                                        





                                 09/23/20 06:50 





                                 09/23/20 06:50 





                                        











  09/22/20 09/22/20 09/22/20





  19:52 19:52 19:52


 


WBC  39.0 H*  


 


RBC  3.47 L  


 


Hgb  10.2 L  


 


Hct  31.5 L  


 


MCV  91  


 


MCH  29.3  


 


MCHC  32.3  


 


RDW  16.3 H  


 


Plt Count  429  


 


Seg Neutrophils %  Not Reportable  


 


Sodium   126.0 L 


 


Potassium   4.8 


 


Chloride   96 L 


 


Carbon Dioxide   23 


 


Anion Gap   7 


 


BUN   19 


 


Creatinine   0.81 


 


Est GFR ( Amer)   > 60 


 


Glucose   130 H 


 


Lactic Acid    1.6


 


Calcium   8.8 


 


Ionized Calcium Angela   


 


Phosphorus   


 


Magnesium   2.2 


 


Total Bilirubin   0.4 


 


AST   128 H 


 


Alkaline Phosphatase   338 H 


 


Ammonia   


 


Total Protein   5.5 L 


 


Albumin   3.0 L 


 


TSH   


 


Urine Color   


 


Urine Appearance   


 


Urine pH   


 


Ur Specific Gravity   


 


Urine Protein   


 


Urine Glucose (UA)   


 


Urine Ketones   


 


Urine Blood   


 


Urine Nitrite   


 


Ur Leukocyte Esterase   


 


Urine WBC (Auto)   


 


Urine RBC (Auto)   














  09/22/20 09/22/20 09/22/20





  19:55 20:11 22:06


 


WBC   


 


RBC   


 


Hgb   


 


Hct   


 


MCV   


 


MCH   


 


MCHC   


 


RDW   


 


Plt Count   


 


Seg Neutrophils %   


 


Sodium   


 


Potassium   


 


Chloride   


 


Carbon Dioxide   


 


Anion Gap   


 


BUN   


 


Creatinine   


 


Est GFR (African Amer)   


 


Glucose   


 


Lactic Acid   


 


Calcium   


 


Ionized Calcium Angela    1.15


 


Phosphorus   


 


Magnesium   


 


Total Bilirubin   


 


AST   


 


Alkaline Phosphatase   


 


Ammonia   


 


Total Protein   


 


Albumin   


 


TSH  1.05  


 


Urine Color   YELLOW 


 


Urine Appearance   CLEAR 


 


Urine pH   6.0 


 


Ur Specific [place]   1.011 


 


Urine Protein   NEGATIVE 


 


Urine Glucose (UA)   NEGATIVE 


 


Urine Ketones   NEGATIVE 


 


Urine Blood   NEGATIVE 


 


Urine Nitrite   NEGATIVE 


 


Ur Leukocyte Esterase   NEGATIVE 


 


Urine WBC (Auto)   1 


 


Urine RBC (Auto)   0 














  09/23/20 09/23/20 09/23/20





  06:50 06:50 06:50


 


WBC   27.4 H 


 


RBC   3.23 L 


 


Hgb   9.6 L 


 


Hct   29.3 L 


 


MCV   91 


 


MCH   29.7 


 


MCHC   32.7 


 


RDW   16.6 H 


 


Plt Count   378 


 


Seg Neutrophils %   Not Reportable 


 


Sodium    128.9 L


 


Potassium    4.8


 


Chloride    99


 


Carbon Dioxide    28


 


Anion Gap    2 L


 


BUN    15


 


Creatinine    0.72


 


Est GFR ( Amer)    > 60


 


Glucose    79


 


Lactic Acid   


 


Calcium    7.8 L


 


Ionized Calcium Angela   


 


Phosphorus    3.4


 


Magnesium    1.7


 


Total Bilirubin    0.4


 


AST    64 H


 


Alkaline Phosphatase    261 H


 


Ammonia  < 8.7 L  


 


Total Protein    4.7 L


 


Albumin    2.5 L


 


TSH   


 


Urine Color   


 


Urine Appearance   


 


Urine pH   


 


Ur Specific Gravity   


 


Urine Protein   


 


Urine Glucose (UA)   


 


Urine Ketones   


 


Urine Blood   


 


Urine Nitrite   


 


Ur Leukocyte Esterase   


 


Urine WBC (Auto)   


 


Urine RBC (Auto)   














Assessment & Plan





- Diagnosis


(1) Seizure disorder


Plan: 


Patient is now having continuous tonic seizures affecting her right side with 

fixed right gaze palsy.  Patient currently is on Keppra but I do not think it is

holding back for seizures of the moment.  We will therefore order IV Dilantin 

500 mg loading followed by Vimpat 50 mg twice daily as per discussions done with

neurology in [place]. The causes for her seizures which are rather focal 

could be because of scarring tissue from her previous radiation/surgery on her 

left brain/hypertensive encephalopathy/idiopathic. Besides the above measures I 

am also going to recheck her blood pressure and treat her with IV hydralazine f

or blood pressures over 170 systolic.  Discussed this with Dr. Chavarria/hospitalist.








(2) Hypocalcemia


Plan: 


Initiate work-up.  Currently normal.








(3) Hypomagnesemia


Plan: 


Currently normal.  Monitor.








(4) Hypertension


Is this a current diagnosis for this admission?: Yes   


Plan: 


Uncontrolled.Will recheck and start IV hydralazine.  Discussed with Dr. Chavarria.








(5) Leukocytosis, unspecified


Qualifiers: 


   Leukocytosis type: unspecified   Qualified Code(s): D72.829 - Elevated white 

blood cell count, unspecified   


Plan: 


Most likely combination of CLL/steroids.  Monitor.








(6) Malnutrition


Qualifiers: 


   Malnutrition type: protein-calorie malnutrition   Protein-calorie 

malnutrition severity: moderate   Qualified Code(s): E44.0 - Moderate protein-

calorie malnutrition   


Is this a current diagnosis for this admission?: Yes   


Plan: 


Status quo.








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


Qualifiers: 


   Laterality: unspecified laterality   Qualified Code(s): C34.90 - Malignant 

neoplasm of unspecified part of unspecified bronchus or lung   


Is this a current diagnosis for this admission?: Yes   


Plan: 


With brain mets.  As per Dr. Snyder/[facility] oncologist.








(8) Urinary retention


Is this a current diagnosis for this admission?: Yes   


Plan: 


Status post Cuellar catheter with recovery of 1.7 L of urine.

## 2020-09-23 NOTE — PDOC CONSULTATION
Consultation


Consult Date: 09/23/20


Attending physician:: KUNAL BYRD


Provider Consulted: TIFFANI VAZQUEZ


Consult reason:: dyspnea





History of Present Illness


Admission Date/PCP: 


  09/22/20 20:08





  KUNAL BYRD MD





History of Present Illness: 


ELOISA BYRD is a 74 year old femaledecling status led to ED visit and 

subsequent admission.She has become lethargic,unilateral jerking  with focal 

weskness.She has been battling stage 4 lung cancer,CLL,chroic Hyponatemie.She 

was interactive until 4 days ago.CT scan MRI nondiagnostic for a bleed or brain 

mass.Recent PET_CT no new metabolic activity.





Past Medical History


Cardiac Medical History: Reports: Hypertension


   Denies: Myocardial Infarction


Pulmonary Medical History: 


   Denies: Asthma, Tuberculosis


Neurological Medical History: 


   Denies: Seizures


Malignancy Medical History: Reports: Lung Cancer, Other - CLL


GI Medical History: 


   Denies: Hepatitis, Hiatal Hernia


Psychiatric Medical History: 


   Denies: Depression


Hematology: Reports: Anemia


   Denies: Sickle Cell Disease





Past Surgical History


Past Surgical History: 


   Denies: Amputation, Mastectomy, Pacemaker





Social History


Information Source: [facility] hospital Records


Lives with: Family


Smoking Status: Never Smoker


Frequency of Alcohol Use: None


Hx Recreational Drug Use: No


Hx Prescription Drug Abuse: No


Do you have pets?: No


Have you had any respiratory illnesses as a child?: No


Have you been exposed to any sick contacts recently?: No


Have you had any recent respiratory illnesses?: No


Have you travelled outside of [place] in the past 12 months?: No





- Advance Directive


Resuscitation Status: Do Not Resuscitate





Family History


Family History: Reviewed & Not Pertinent


Parental Family History Reviewed: No


Children Family History Reviewed: No


Sibling(s) Family History Reviewed.: No





Medication/Allergy


Allergies/Adverse Reactions: 


                                        





Penicillins Allergy (Unknown, Verified 10/18/13 12:21)


   


Sulfa (Sulfonamide Antibiotics) Allergy (Unknown, Verified 10/18/13 12:21)


   











Review of Systems


ROS unobtainable: Due to mental status





Physical Exam


Vital Signs: 


                                        











Temp Pulse Resp BP Pulse Ox


 


 98.7 F   109 H  18   184/92 H  98 


 


 09/23/20 10:00  09/23/20 08:00  09/23/20 08:00  09/23/20 08:00  09/23/20 08:00








                                 Intake & Output











 09/22/20 09/23/20 09/24/20





 06:59 06:59 06:59


 


Intake Total  100 


 


Output Total   700


 


Balance  100 -700


 


Weight  38.6 kg 











General appearance: PRESENT: no acute distress, disheveled, thin, well-deve

loped.  ABSENT: cooperative


Head exam: PRESENT: atraumatic, normocephalic


Eye exam: PRESENT: conjunctiva pale.  ABSENT: nystagmus, periorbital swelling, 

scleral icterus


Mouth exam: PRESENT: dry mucosa, neck supple, tongue midline


Neck exam: ABSENT: carotid bruit, full ROM, JVD, lymphadenopathy, meningismus, 

tenderness, thyromegaly, tracheal deviation, tracheostomy, other


Respiratory exam: PRESENT: decreased breath sounds, prolonged expiratory phas, 

rales - L base, rhonchi, unlabored.  ABSENT: retraction, stridor, symmetrical, 

tachypnea


Cardiovascular exam: PRESENT: RRR, +S1, +S2


Pulses: PRESENT: normal radial pulses


GI/Abdominal exam: PRESENT: soft.  ABSENT: firm, mass, rebound, tenderness


Extremities exam: ABSENT: calf tenderness, clubbing, joint swelling, tenderness


Musculoskeletal exam: ABSENT: ambulatory, deformity, dislocation


Neurological exam: PRESENT: altered


Psychiatric exam: PRESENT: flat affect


Skin exam: PRESENT: dry, warm





Results


Laboratory Results: 


                                        





                                 09/23/20 06:50 





                                 09/23/20 06:50 





                                        











  09/22/20 09/22/20 09/22/20





  19:52 19:52 19:52


 


WBC  39.0 H*  


 


RBC  3.47 L  


 


Hgb  10.2 L  


 


Hct  31.5 L  


 


MCV  91  


 


MCH  29.3  


 


MCHC  32.3  


 


RDW  16.3 H  


 


Plt Count  429  


 


Seg Neutrophils %  Not Reportable  


 


Sodium   126.0 L 


 


Potassium   4.8 


 


Chloride   96 L 


 


Carbon Dioxide   23 


 


Anion Gap   7 


 


BUN   19 


 


Creatinine   0.81 


 


Est GFR ( Amer)   > 60 


 


Glucose   130 H 


 


Lactic Acid    1.6


 


Calcium   8.8 


 


Ionized Calcium Angela   


 


Phosphorus   


 


Magnesium   2.2 


 


Total Bilirubin   0.4 


 


AST   128 H 


 


Alkaline Phosphatase   338 H 


 


Ammonia   


 


Total Protein   5.5 L 


 


Albumin   3.0 L 


 


TSH   


 


Urine Color   


 


Urine Appearance   


 


Urine pH   


 


Ur Specific Gravity   


 


Urine Protein   


 


Urine Glucose (UA)   


 


Urine Ketones   


 


Urine Blood   


 


Urine Nitrite   


 


Ur Leukocyte Esterase   


 


Urine WBC (Auto)   


 


Urine RBC (Auto)   














  09/22/20 09/22/20 09/22/20





  19:55 20:11 22:06


 


WBC   


 


RBC   


 


Hgb   


 


Hct   


 


MCV   


 


MCH   


 


MCHC   


 


RDW   


 


Plt Count   


 


Seg Neutrophils %   


 


Sodium   


 


Potassium   


 


Chloride   


 


Carbon Dioxide   


 


Anion Gap   


 


BUN   


 


Creatinine   


 


Est GFR (African Amer)   


 


Glucose   


 


Lactic Acid   


 


Calcium   


 


Ionized Calcium Angela    1.15


 


Phosphorus   


 


Magnesium   


 


Total Bilirubin   


 


AST   


 


Alkaline Phosphatase   


 


Ammonia   


 


Total Protein   


 


Albumin   


 


TSH  1.05  


 


Urine Color   YELLOW 


 


Urine Appearance   CLEAR 


 


Urine pH   6.0 


 


Ur Specific [place]   1.011 


 


Urine Protein   NEGATIVE 


 


Urine Glucose (UA)   NEGATIVE 


 


Urine Ketones   NEGATIVE 


 


Urine Blood   NEGATIVE 


 


Urine Nitrite   NEGATIVE 


 


Ur Leukocyte Esterase   NEGATIVE 


 


Urine WBC (Auto)   1 


 


Urine RBC (Auto)   0 














  09/23/20 09/23/20 09/23/20





  06:50 06:50 06:50


 


WBC   27.4 H 


 


RBC   3.23 L 


 


Hgb   9.6 L 


 


Hct   29.3 L 


 


MCV   91 


 


MCH   29.7 


 


MCHC   32.7 


 


RDW   16.6 H 


 


Plt Count   378 


 


Seg Neutrophils %   Not Reportable 


 


Sodium    128.9 L


 


Potassium    4.8


 


Chloride    99


 


Carbon Dioxide    28


 


Anion Gap    2 L


 


BUN    15


 


Creatinine    0.72


 


Est GFR ( Amer)    > 60


 


Glucose    79


 


Lactic Acid   


 


Calcium    7.8 L


 


Ionized Calcium Angela   


 


Phosphorus    3.4


 


Magnesium    1.7


 


Total Bilirubin    0.4


 


AST    64 H


 


Alkaline Phosphatase    261 H


 


Ammonia  < 8.7 L  


 


Total Protein    4.7 L


 


Albumin    2.5 L


 


TSH   


 


Urine Color   


 


Urine Appearance   


 


Urine pH   


 


Ur Specific Gravity   


 


Urine Protein   


 


Urine Glucose (UA)   


 


Urine Ketones   


 


Urine Blood   


 


Urine Nitrite   


 


Ur Leukocyte Esterase   


 


Urine WBC (Auto)   


 


Urine RBC (Auto)   











Impressions: 


                                        





Abdomen/Pelvis CT  09/23/20 00:00


IMPRESSION:  Increasing left pleural effusion.  Complete collapse of left lower 

lobe as described.


 


IMPRESSION:  No acute findings in the abdomen or pelvis.  The study is degraded 

by respiratory motion.


 








Chest CT  09/23/20 00:00


IMPRESSION:  Increasing left pleural effusion.  Complete collapse of left lower 

lobe as described.


 


IMPRESSION:  No acute findings in the abdomen or pelvis.  The study is degraded 

by respiratory motion.


 








Head CT  09/23/20 00:00


IMPRESSION:  1.  Small metastatic foci in the left cerebral hemisphere are again

noted but less apparent on the current study.  This in part is due to lack of IV

contrast.


2.  No intracranial hemorrhage.  No midline shift.


EVIDENCE OF ACUTE STROKE: NO.


 














Assessment & Plan





- Diagnosis


(1) Pleural effusion on left


Is this a current diagnosis for this admission?: Yes   


Plan: 


If no too agressive would suggest a ultrasound of the left hemithorax plus minus

thoracentesis as this may be a cause of her leukocytosis(unrelated to bm 

stimulation) and altered mental status outpost obstructive pneumonitis with an 

empyema








(2) Chronic hyponatremia


Is this a current diagnosis for this admission?: Yes   


Plan: 


Relatively stable








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


Qualifiers: 


   Laterality: unspecified laterality   Qualified Code(s): C34.90 - Malignant 

neoplasm of unspecified part of unspecified bronchus or lung   


Is this a current diagnosis for this admission?: Yes   


Plan: 


As per oncology questionable CNS mets due to some neurological signs however 

this cannot be verified with MRI or CT scan








(4) Cough


Is this a current diagnosis for this admission?: Yes   


Plan: 


hycosamine








(5) History of chronic lymphocytic leukemia


Is this a current diagnosis for this admission?: Yes   


Plan: 


followed by oncology








- Time


Time Spent with patient: 





50 min


Time Spent: 50 to 70 Minutes

## 2020-09-23 NOTE — PDOC CONSULTATION
Consultation


Consult Date: 09/23/20


Attending physician:: JIMMY GALLARDO


Provider Consulted: AMEYA KING


Consult reason:: Patient well-known to oncologist team with stage IV lung cancer

here with extreme right-sided weakness, altered mental status





History of Present Illness


Admission Date/PCP: 


  09/22/20 20:08





  KUNAL BYRD MD





Patient complains of: weakness, confusion


History of Present Illness: 


ELOISA BYRD is a 74 year old female with known history of stage IV lung 

cancer, diagnosis for nearly 8 years.  In 12/2019, she was found to have new 

brain metastasis, she had gamma knife radiation to the brain with good control 

of disease.  She was then followed for about 4 months and then had recurrent 

disease in the lung, PET/CT indicated positivity.  We started her on 

chemotherapy with Taxotere and Cyramza, she had 3 cycles then restaging of the 

brain and lungs, this was done in July, this indicated overall improvement in 

disease.  She was tolerating therapy generally well but getting weaker with each

therapy.  She did get significant mucositis and esophagitis with each therapy, 

and did require aggressive hydration throughout.  She was doing as well she did 

with previous chemo until this past Friday.  At that time she began having right

upper and lower extremity weakness, and began having twitching and repetitive 

movements, she is a family friend so I actually saw her over the weekend, and we

increased her Keppra to 1000 mg twice daily as well as increased her 

dexamethasone which was usually being given at 1 mg daily to 4 mg 3 times daily.

 She worsened over the next 2 days and on Monday we got a stat MRI and MRI of 

the brain.  I reviewed this along with her radiation oncologist, Dr. Balbuena, 

in [place], there was a lot of motion artifact, but overall the changes in 

the brain seems stable compared to the previous imaging.  There is no new areas 

of disease.  And no explanation for the acute right-sided weakness as well as 

twitching versus seizure activity.  Stat labs drawn that day indicated severe 

hypomagnesemia with a magnesium of 0.9 and hypocalcemia with a calcium in the 

low 6 range.  She was given IV hydration with calcium and magnesium.  I received

a call yesterday night from her son who came back from work and noticed that she

was poorly responsive, not conversant, and looked much worse.  She was brought 

to the ED, and now electrolytes looked improved, magnesium was in the 2 range, 

calcium was up to 8, although it is down to 7 today.  This morning she does not 

look too much better.  Of note, she had a Cuellar catheter placed and nearly 2 L 

of urine came out.








Past Medical History


Cardiac Medical History: Reports: Hypertension


   Denies: Myocardial Infarction


Pulmonary Medical History: 


   Denies: Asthma, Tuberculosis


Neurological Medical History: 


   Denies: Seizures


Malignancy Medical History: Reports: Lung Cancer


GI Medical History: 


   Denies: Hepatitis, Hiatal Hernia


Psychiatric Medical History: 


   Denies: Depression


Hematology: Reports: Anemia


   Denies: Sickle Cell Disease





Past Surgical History


Past Surgical History: 


   Denies: Amputation, Mastectomy, Pacemaker





Social History


Information Source: Relative


Lives with: Family


Smoking Status: Never Smoker


Electronic Cigarette use?: No


Frequency of Alcohol Use: None


Hx Recreational Drug Use: No


Hx Prescription Drug Abuse: No





- Advance Directive


Resuscitation Status: Do Not Resuscitate





Family History


Family History: Reviewed & Not Pertinent


Parental Family History Reviewed: Yes


Children Family History Reviewed: Yes


Sibling(s) Family History Reviewed.: Yes





Medication/Allergy


Home Medications: 








Atenolol [Tenormin 50 Mg Tablet] 50 mg PO DAILY 10/18/13 


Cholecalciferol (Vitamin D3) [Vitamin D3] 1,250 mcg PO MO@1000 09/22/20 


Dexamethasone [Decadron 4 Mg Tablet] 4 mg PO DAILY 09/22/20 


Esomeprazole Magnesium 40 mg PO DAILY 09/22/20 


Famotidine [Pepcid 20 mg Tablet] 20 mg PO QPM 09/22/20 


Folic Acid [Folvite 1 mg Tablet] 1 mg PO DAILY 09/22/20 


Levetiracetam [Keppra 500 mg Tablet] 500 mg PO Q12 09/22/20 


Levothyroxine Sodium [Synthroid 0.1 mg Tablet] 0.1 mg PO Q6AM 09/22/20 


Lorazepam [Ativan 0.5 mg Tablet] 0.5 mg PO Q8HP PRN 09/22/20 








Allergies/Adverse Reactions: 


                                        





Penicillins Allergy (Unknown, Verified 10/18/13 12:21)


   


Sulfa (Sulfonamide Antibiotics) Allergy (Unknown, Verified 10/18/13 12:21)


   











Review of Systems


ROS unobtainable: Due to mental status





Physical Exam


Vital Signs: 


                                        











Temp Pulse Resp BP Pulse Ox


 


 98.7 F   109 H  18   184/92 H  98 


 


 09/23/20 08:00  09/23/20 08:00  09/23/20 08:00  09/23/20 08:00  09/23/20 08:00








                                 Intake & Output











 09/22/20 09/23/20 09/24/20





 06:59 06:59 06:59


 


Intake Total  100 


 


Balance  100 


 


Weight  38.6 kg 











General appearance: PRESENT: no acute distress, well-developed, well-nourished


Head exam: PRESENT: atraumatic, normocephalic


Eye exam: PRESENT: conjunctiva pink, EOMI, PERRLA.  ABSENT: scleral icterus


Ear exam: PRESENT: normal external ear exam


Mouth exam: PRESENT: moist, tongue midline


Neck exam: ABSENT: carotid bruit, JVD, lymphadenopathy, thyromegaly


Respiratory exam: PRESENT: clear to auscultation raymundo.  ABSENT: rales, rhonchi, w

heezes


Cardiovascular exam: PRESENT: RRR.  ABSENT: diastolic murmur, rubs, systolic 

murmur


Pulses: PRESENT: normal dorsalis pedis pul


Vascular exam: PRESENT: normal capillary refill


GI/Abdominal exam: PRESENT: normal bowel sounds, soft.  ABSENT: distended, 

guarding, mass, organolmegaly, rebound, tenderness


Rectal exam: PRESENT: deferred


Extremities exam: PRESENT: full ROM.  ABSENT: calf tenderness, clubbing, pedal 

edema


Neurological exam: PRESENT: alert, awake, oriented to person, oriented to place,

oriented to time, oriented to situation, CN II-XII grossly intact.  ABSENT: 

motor sensory deficit


Psychiatric exam: PRESENT: appropriate affect, normal mood.  ABSENT: homicidal 

ideation, suicidal ideation


Skin exam: PRESENT: dry, intact, warm.  ABSENT: cyanosis, rash





Results


Laboratory Results: 


                                        





                                 09/23/20 06:50 





                                 09/23/20 06:50 





                                        











  09/22/20 09/22/20 09/22/20





  19:52 19:52 19:52


 


WBC  39.0 H*  


 


RBC  3.47 L  


 


Hgb  10.2 L  


 


Hct  31.5 L  


 


MCV  91  


 


MCH  29.3  


 


MCHC  32.3  


 


RDW  16.3 H  


 


Plt Count  429  


 


Seg Neutrophils %  Not Reportable  


 


Sodium   126.0 L 


 


Potassium   4.8 


 


Chloride   96 L 


 


Carbon Dioxide   23 


 


Anion Gap   7 


 


BUN   19 


 


Creatinine   0.81 


 


Est GFR ( Amer)   > 60 


 


Glucose   130 H 


 


Lactic Acid    1.6


 


Calcium   8.8 


 


Ionized Calcium Angela   


 


Phosphorus   


 


Magnesium   2.2 


 


Total Bilirubin   0.4 


 


AST   128 H 


 


Alkaline Phosphatase   338 H 


 


Ammonia   


 


Total Protein   5.5 L 


 


Albumin   3.0 L 


 


TSH   


 


Urine Color   


 


Urine Appearance   


 


Urine pH   


 


Ur Specific Gravity   


 


Urine Protein   


 


Urine Glucose (UA)   


 


Urine Ketones   


 


Urine Blood   


 


Urine Nitrite   


 


Ur Leukocyte Esterase   


 


Urine WBC (Auto)   


 


Urine RBC (Auto)   














  09/22/20 09/22/20 09/22/20





  19:55 20:11 22:06


 


WBC   


 


RBC   


 


Hgb   


 


Hct   


 


MCV   


 


MCH   


 


MCHC   


 


RDW   


 


Plt Count   


 


Seg Neutrophils %   


 


Sodium   


 


Potassium   


 


Chloride   


 


Carbon Dioxide   


 


Anion Gap   


 


BUN   


 


Creatinine   


 


Est GFR (African Amer)   


 


Glucose   


 


Lactic Acid   


 


Calcium   


 


Ionized Calcium Angela    1.15


 


Phosphorus   


 


Magnesium   


 


Total Bilirubin   


 


AST   


 


Alkaline Phosphatase   


 


Ammonia   


 


Total Protein   


 


Albumin   


 


TSH  1.05  


 


Urine Color   YELLOW 


 


Urine Appearance   CLEAR 


 


Urine pH   6.0 


 


Ur Specific [place]   1.011 


 


Urine Protein   NEGATIVE 


 


Urine Glucose (UA)   NEGATIVE 


 


Urine Ketones   NEGATIVE 


 


Urine Blood   NEGATIVE 


 


Urine Nitrite   NEGATIVE 


 


Ur Leukocyte Esterase   NEGATIVE 


 


Urine WBC (Auto)   1 


 


Urine RBC (Auto)   0 














  09/23/20 09/23/20 09/23/20





  06:50 06:50 06:50


 


WBC   27.4 H 


 


RBC   3.23 L 


 


Hgb   9.6 L 


 


Hct   29.3 L 


 


MCV   91 


 


MCH   29.7 


 


MCHC   32.7 


 


RDW   16.6 H 


 


Plt Count   378 


 


Seg Neutrophils %   Not Reportable 


 


Sodium    128.9 L


 


Potassium    4.8


 


Chloride    99


 


Carbon Dioxide    28


 


Anion Gap    2 L


 


BUN    15


 


Creatinine    0.72


 


Est GFR ( Amer)    > 60


 


Glucose    79


 


Lactic Acid   


 


Calcium    7.8 L


 


Ionized Calcium Angela   


 


Phosphorus    3.4


 


Magnesium    1.7


 


Total Bilirubin    0.4


 


AST    64 H


 


Alkaline Phosphatase    261 H


 


Ammonia  < 8.7 L  


 


Total Protein    4.7 L


 


Albumin    2.5 L


 


TSH   


 


Urine Color   


 


Urine Appearance   


 


Urine pH   


 


Ur Specific Gravity   


 


Urine Protein   


 


Urine Glucose (UA)   


 


Urine Ketones   


 


Urine Blood   


 


Urine Nitrite   


 


Ur Leukocyte Esterase   


 


Urine WBC (Auto)   


 


Urine RBC (Auto)   














Assessment & Plan





- Diagnosis


(1) Generalized weakness


Is this a current diagnosis for this admission?: Yes   


Plan: 


Initially thought it was secondary to dehydration from chemotherapy, she has 

been hydrated now for about 8 hours, recommend continued hydration aggressively 

over the next 24 hours to see if there is improvement.








(2) Hemiparesis


Qualifiers: 


   Hemiparesis etiology: unspecified   Hemiparesis laterality: right dominant 

side   Qualified Code(s): G81.91 - Hemiplegia, unspecified affecting right 

dominant side   


Is this a current diagnosis for this admission?: Yes   


Plan: 


I will go ahead and get a stat CT head noncontrast to make sure that there is no

sign of bleeding or any changes.








(3) Chronic hyponatremia


Is this a current diagnosis for this admission?: Yes   


Plan: 


Likely secondary to chemotherapy, possibly also from steroid, possible 

paraneoplastic as well.  We will get Dr. Arias involved to help out as well.








(4) Lung cancer


Qualifiers: 


   Laterality: left   Lung location: lower lobe of lung   Qualified Code(s): 

C34.32 - Malignant neoplasm of lower lobe, left bronchus or lung   


Is this a current diagnosis for this admission?: Yes   


Plan: 


Stage IV lung cancer we will restage her with CT of the chest abdomen pelvis 

today.








- Time


Time Spent: Greater than 70 Minutes


Disposition: 


Had long conversation with son and family, if there is no improvement in the 

next 24 hours or if there is overt progression noted on CT imaging, we will 

probably go  towards a comfort care/hospice approach.  But hopefully she could 

improve over the next 24 hours.  Unfortunately, thus far, no distinct cause for 

the right-sided hemiparesis.








- Inpatient Certification


Based on my medical assessment, after consideration of the patient's 

comorbidities, presenting symptoms, or acuity I expect that the services needed 

warrant INPATIENT care.: Yes


I certify that my determination is in accordance with my understanding of 

Medicare's requirements for reasonable and necessary INPATIENT services [42 CFR 

412.3e].: Yes


Medical Necessity: Need For IV Fluids, Need for Neurological Checks, Risk of 

Complication if Not Cared For in Hospital

## 2020-09-23 NOTE — RADIOLOGY REPORT (SQ)
EXAM DESCRIPTION:  CT CHEST WITH; CT ABD/PELVIS WITH IV ONLY



IMAGES COMPLETED DATE/TIME:  9/23/2020 11:00 am



REASON FOR STUDY:  stage 4 lung cancer  C34.31  MALIGNANT NEOPLASM OF LOWER LOBE, RIGHT BRONCHUS OR L
 C34.30  MALIGNANT NEOPLASM OF LOWER LOBE, UNSP BRONCHUS OR TJ



CONTRAST TYPE AND DOSE:  contrast/concentration: Isovue 350.00 mmol/ml; Total Contrast Delivered: 45.
0 ml; Total Saline Delivered: 30.0 ml



RENAL FUNCTION:  BUN 15, creatinine 0.72



COMPARISON:  PET-CT dated 7/28/2020



TECHNIQUE:  CT scan of the chest performed using helical scanning technique with dynamic intravenous 
contrast injection.  Images reviewed with lung, soft tissue and bone windows. Reconstructed coronal a
nd sagittal MPR images reviewed.  All images stored on PACS.

All CT scanners at this facility use dose modulation, iterative reconstruction, and/or weight based d
osing when appropriate to reduce radiation dose to as low as reasonably achievable (ALARA).

[facility]: Dose Right  [facility]: CareDose    [facility]: Dose Right    [facility]: Teradose 4D    [facility]: Smart Technologies



RADIATION DOSE:  CT Rad equipment meets quality standard of care and radiation dose reduction techniq
ues were employed. CTDIvol: 4.4 - 4.5 mGy. DLP: 586 mGy-cm. .



LIMITATIONS:  None.



FINDINGS:  AXILLAE: No adenopathy.

CHEST WALL: No masses.  No subcutaneous air.

LUNGS: Persistent soft tissue attenuation in the left hilum.  This is not significantly changed from 
recent PET-CT.  There is complete collapse of the left lower lobe which is a new finding.  This eithe
r suggest progression of disease with postobstructive atelectasis or mucous plug.  There is a moderat
e left pleural effusion which is increased since prior PET-CT.

PLEURA: Moderate effusion as discussed above.

THYROID: No masses or significant asymmetry.

HILAR AND MEDIASTINAL STRUCTURES: Left hilar fullness.  Study is degraded by respiratory motion.  Nila
pect left hilar mass as previously demonstrated.

AORTA AND GREAT VESSELS: No aneurysm.  No dissection.

PULMONARY ARTERIES: No identified pulmonary emboli.  Study not optimized for the pulmonary arteries.

HEART: No pericardial effusion.

HARDWARE AND LIFELINES: None.

BONES: No significant finding.

OTHER: Moderate size hiatal hernia.



IMPRESSION:  Increasing left pleural effusion.  Complete collapse of left lower lobe as described.



COMPARISON:  None.



RADIATION DOSE:  CT Rad equipment meets quality standard of care and radiation dose reduction techniq
ues were employed. CTDIvol: 4.4 - 4.5 mGy. DLP: 586 mGy-cm. mGy.



TECHNIQUE:  CT scan of the abdomen and pelvis performed with intravenous and oral contrast using wilbert
meliton scanning technique with dynamic intravenous contrast injection.  Images reviewed with lung, soft 
tissue and bone windows.  Reconstructed coronal and sagittal MPR images reviewed.  Delayed images for
 evaluation of the urinary system also acquired and evaluated. All images stored on PACS.

All CT scanners at this facility use dose modulation, iterative reconstruction, and/or weight based d
osing when appropriate to reduce radiation dose to as low as reasonably achievable (ALARA).

[facility]: Dose Right  [facility]: SureCare    [facility]: Dose Right    [facility]: Teradose 4D    [facility]: Smart Technologies



FINDINGS:  LIVER: Normal size. No masses.  No dilated ducts.

SPLEEN: Normal size.  No focal lesions.

PANCREAS: Atrophic pancreas.  No masses.

GALLBLADDER: Mildly distended gallbladder.  No surrounding edema.

ADRENAL GLANDS: No significant masses or asymmetry.

RIGHT KIDNEY AND URETER: No solid masses.   No significant calcifications.   No hydronephrosis or hyd
roureter.

LEFT KIDNEY AND URETER: No solid masses.  Simple left renal cyst.   No significant calcifications.   
No hydronephrosis or hydroureter.

AORTA AND VESSELS: Atherosclerotic change.  No aneurysmal dilatation.

RETROPERITONEUM: No retroperitoneal adenopathy, hemorrhage or masses.

LARGE AND SMALL BOWEL: No dilatation.  No masses.  No wall thickening.

APPENDIX: Prior appendectomy.

ABDOMINAL WALL: No hernia or masses.

PERITONEAL CAVITY: No free air.  No free fluid.  No peritoneal implants or masses.

PELVIS: Cuellar catheter decompresses the bladder.

BONES: No significant or acute findings.

OTHER: No other significant finding.



IMPRESSION:  No acute findings in the abdomen or pelvis.  The study is degraded by respiratory motion
.



TECHNICAL DOCUMENTATION:  JOB ID:  [id number]

Quality ID # [id number]: Final reports with documentation of one or more dose reduction techniques (e.g., Au
tomated exposure control, adjustment of the mA and/or kV according to patient size, use of iterative 
reconstruction technique)

 2011 [employer]- All Rights Reserved



Reading location - IP/workstation name: [email or web address]

## 2020-09-24 LAB
ABSOLUTE LYMPHOCYTES# (MANUAL): 4.8 10^3/UL (ref 0.5–4.7)
ABSOLUTE MONOCYTES # (MANUAL): 0 10^3/UL (ref 0.1–1.4)
ADD MANUAL DIFF: YES
ALBUMIN SERPL-MCNC: 2.1 G/DL (ref 3.5–5)
ALP SERPL-CCNC: 184 U/L (ref 38–126)
ANION GAP SERPL CALC-SCNC: 6 MMOL/L (ref 5–19)
ANISOCYTOSIS BLD QL SMEAR: (no result)
AST SERPL-CCNC: 49 U/L (ref 14–36)
BASO STIPL BLD QL SMEAR: PRESENT
BASOPHILS NFR BLD MANUAL: 0 % (ref 0–2)
BILIRUB DIRECT SERPL-MCNC: 0.4 MG/DL (ref 0–0.4)
BILIRUB SERPL-MCNC: 0.5 MG/DL (ref 0.2–1.3)
BUN SERPL-MCNC: 14 MG/DL (ref 7–20)
CALCIUM: 6.7 MG/DL (ref 8.4–10.2)
CHLORIDE SERPL-SCNC: 101 MMOL/L (ref 98–107)
CO2 SERPL-SCNC: 21 MMOL/L (ref 22–30)
EOSINOPHIL NFR BLD MANUAL: 0 % (ref 0–6)
ERYTHROCYTE [DISTWIDTH] IN BLOOD BY AUTOMATED COUNT: 16.6 % (ref 11.5–14)
GLUCOSE SERPL-MCNC: 109 MG/DL (ref 75–110)
HCT VFR BLD CALC: 26.3 % (ref 36–47)
HGB BLD-MCNC: 8.8 G/DL (ref 12–15.5)
MCH RBC QN AUTO: 30.4 PG (ref 27–33.4)
MCHC RBC AUTO-ENTMCNC: 33.4 G/DL (ref 32–36)
MCV RBC AUTO: 91 FL (ref 80–97)
MONOCYTES % (MANUAL): 0 % (ref 3–13)
NRBC BLD AUTO-RTO: 2 /100 WBC
OVALOCYTES BLD QL SMEAR: SLIGHT
PLATELET # BLD: 337 10^3/UL (ref 150–450)
PLATELET COMMENT: ADEQUATE
PLATELET LARGE: PRESENT
POLYCHROMASIA BLD QL SMEAR: (no result)
POTASSIUM SERPL-SCNC: 3.8 MMOL/L (ref 3.6–5)
PROT SERPL-MCNC: 4.2 G/DL (ref 6.3–8.2)
RBC # BLD AUTO: 2.89 10^6/UL (ref 3.72–5.28)
SEGMENTED NEUTROPHILS % (MAN): 77 % (ref 42–78)
TOTAL CELLS COUNTED BLD: 100
VARIANT LYMPHS NFR BLD MANUAL: 23 % (ref 13–45)
WBC # BLD AUTO: 20.7 10^3/UL (ref 4–10.5)

## 2020-09-24 RX ADMIN — LEVETIRACETAM SCH MLS/HR: 10 INJECTION INTRAVENOUS at 12:40

## 2020-09-24 RX ADMIN — CALCIUM GLUCONATE SCH MLS/HR: 20 INJECTION, SOLUTION INTRAVENOUS at 13:22

## 2020-09-24 RX ADMIN — SODIUM CHLORIDE PRN MLS/HR: 9 INJECTION, SOLUTION INTRAVENOUS at 16:51

## 2020-09-24 RX ADMIN — CALCITRIOL SCH: 0.25 CAPSULE, LIQUID FILLED ORAL at 17:03

## 2020-09-24 RX ADMIN — LEVETIRACETAM SCH MLS/HR: 10 INJECTION INTRAVENOUS at 22:28

## 2020-09-24 RX ADMIN — PANTOPRAZOLE SODIUM SCH MG: 40 INJECTION, POWDER, FOR SOLUTION INTRAVENOUS at 22:28

## 2020-09-24 RX ADMIN — PANTOPRAZOLE SODIUM SCH: 40 INJECTION, POWDER, FOR SOLUTION INTRAVENOUS at 11:30

## 2020-09-24 RX ADMIN — CALCIUM GLUCONATE SCH MLS/HR: 20 INJECTION, SOLUTION INTRAVENOUS at 11:36

## 2020-09-24 RX ADMIN — FOLIC ACID SCH: 1 TABLET ORAL at 17:02

## 2020-09-24 RX ADMIN — CEFEPIME HYDROCHLORIDE SCH MLS/HR: 1 INJECTION, SOLUTION INTRAVENOUS at 17:03

## 2020-09-24 RX ADMIN — DEXAMETHASONE SODIUM PHOSPHATE SCH MG: 4 INJECTION, SOLUTION INTRAMUSCULAR; INTRAVENOUS at 10:30

## 2020-09-24 RX ADMIN — CEFEPIME HYDROCHLORIDE SCH MLS/HR: 1 INJECTION, SOLUTION INTRAVENOUS at 05:15

## 2020-09-24 RX ADMIN — SODIUM CHLORIDE PRN MLS/HR: 9 INJECTION, SOLUTION INTRAVENOUS at 01:18

## 2020-09-24 NOTE — PDOC PROGRESS REPORT
Subjective


Progress Note for:: 09/24/20


Subjective:: 


Pt doing slightly better, seems to track now, following some commands. Still w/ 

mild epileptic activity. Today had long discussion w/ family. 





Reason For Visit: 


WEAKNESS,DEHYDRATION,PERSISTENT HEMIPARESIS








Physical Exam


Vital Signs: 


                                        











Temp Pulse Resp BP Pulse Ox


 


 98.0 F   79   12   132/91 H  99 


 


 09/24/20 07:56  09/24/20 07:56  09/24/20 07:56  09/24/20 07:56  09/24/20 07:56








                                 Intake & Output











 09/23/20 09/24/20 09/25/20





 06:59 06:59 06:59


 


Intake Total 100 2500 


 


Output Total  1800 


 


Balance 100 700 


 


Weight 38.6 kg 38.6 kg 











General appearance: PRESENT: no acute distress, well-developed, well-nourished


Head exam: PRESENT: atraumatic, normocephalic


Eye exam: PRESENT: conjunctiva pink, EOMI, PERRLA.  ABSENT: scleral icterus


Ear exam: PRESENT: normal external ear exam


Mouth exam: PRESENT: moist, tongue midline


Neck exam: ABSENT: carotid bruit, JVD, lymphadenopathy, thyromegaly


Respiratory exam: PRESENT: clear to auscultation raymundo.  ABSENT: rales, rhonchi, 

wheezes


Cardiovascular exam: PRESENT: RRR.  ABSENT: diastolic murmur, rubs, systolic 

murmur


Pulses: PRESENT: normal dorsalis pedis pul


Vascular exam: PRESENT: normal capillary refill


GI/Abdominal exam: PRESENT: normal bowel sounds, soft.  ABSENT: distended, 

guarding, mass, organolmegaly, rebound, tenderness


Rectal exam: PRESENT: deferred


Extremities exam: PRESENT: full ROM.  ABSENT: calf tenderness, clubbing, pedal 

edema


Neurological exam: PRESENT: alert, awake, oriented to person, oriented to place,

oriented to time, oriented to situation, CN II-XII grossly intact.  ABSENT: 

motor sensory deficit


Psychiatric exam: PRESENT: appropriate affect, normal mood.  ABSENT: homicidal 

ideation, suicidal ideation


Skin exam: PRESENT: dry, intact, warm.  ABSENT: cyanosis, rash





Results


Laboratory Results: 


                                        





                                 09/24/20 06:40 





                                 09/24/20 06:40 





                                        











  09/24/20 09/24/20 09/24/20





  06:40 06:40 06:40


 


WBC  20.7 H  


 


RBC  2.89 L  


 


Hgb  8.8 L  


 


Hct  26.3 L  


 


MCV  91  


 


MCH  30.4  


 


MCHC  33.4  


 


RDW  16.6 H  


 


Plt Count  337  


 


Seg Neutrophils %  Not Reportable  


 


Sodium   128.2 L 


 


Potassium   3.8  D 


 


Chloride   101 


 


Carbon Dioxide   21 L 


 


Anion Gap   6 


 


BUN   14 


 


Creatinine   0.60 


 


Est GFR ( Amer)   > 60 


 


Glucose   109 


 


Calcium   6.7 L* 


 


Total Bilirubin   0.5 


 


AST   49 H 


 


Alkaline Phosphatase   184 H 


 


Total Protein   4.2 L 


 


Albumin   2.1 L 


 


PTH Intact    324.6 H











Impressions: 


                                        





Abdomen/Pelvis CT  09/23/20 00:00


IMPRESSION:  Increasing left pleural effusion.  Complete collapse of left lower 

lobe as described.


 


IMPRESSION:  No acute findings in the abdomen or pelvis.  The study is degraded 

by respiratory motion.


 








Chest CT  09/23/20 00:00


IMPRESSION:  Increasing left pleural effusion.  Complete collapse of left lower 

lobe as described.


 


IMPRESSION:  No acute findings in the abdomen or pelvis.  The study is degraded 

by respiratory motion.


 








Head CT  09/23/20 00:00


IMPRESSION:  1.  Small metastatic foci in the left cerebral hemisphere are again

noted but less apparent on the current study.  This in part is due to lack of IV

contrast.


2.  No intracranial hemorrhage.  No midline shift.


EVIDENCE OF ACUTE STROKE: NO.


 














Assessment & Plan





- Diagnosis


(1) Generalized weakness


Is this a current diagnosis for this admission?: Yes   


Plan: 


COnt hydration








(2) Hemiparesis


Qualifiers: 


   Hemiparesis etiology: unspecified   Hemiparesis laterality: right dominant 

side   Qualified Code(s): G81.91 - Hemiplegia, unspecified affecting right 

dominant side   


Is this a current diagnosis for this admission?: Yes   


Plan: 


Probable progressive dx causing status epilepticus, d/w family








(3) Chronic hyponatremia


Is this a current diagnosis for this admission?: Yes   


Plan: 


Dr. Arias following








(4) Lung cancer


Qualifiers: 


   Laterality: left   Lung location: lower lobe of lung   Qualified Code(s): C34

.32 - Malignant neoplasm of lower lobe, left bronchus or lung   


Is this a current diagnosis for this admission?: Yes   


Plan: 


Discussed w/ family, considering hospice if pt does not improve w/ aggressive rx

for seizure








(5) Status epilepticus due to complex partial seizure


Is this a current diagnosis for this admission?: Yes   


Plan: 


Giving daily dilantin, I placed call to neurology today








- Time


Time Spent with patient: 35 or more minutes

## 2020-09-24 NOTE — PDOC PROGRESS REPORT
Subjective


Progress Note for:: 09/24/20


Subjective:: 





74 year old female past medical history of hypertension, hypothyroidism, chronic

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 

brain status post chemoradiation, recently restarted on chemotherapy, currently 

on fifth cycle, last chemotherapy 2 weeks ago, who was sent to ED by her 

oncologist Dr. Snyder for evaluation of generalized weakness, right upper and 

lower extremity weakness, altered mental status, and electrolyte abnormalities.





Source of history is Dr. Seay her son who is present in the room, as per son 

patient was at her baseline ambulating with the help of her  and p.o. 

tolerant, but recently patient has been noted to being more altered,having very 

low appetite with generalized weakness and right-sided upper and lower extremity

weakness and involuntary contractions.  Patient has not had any exposure to COVI

D, is not complaining of any nausea, vomiting, diarrhea, constipation, fever, 

chills, chest pain, shortness of breath.





Initially it was thought that her right upper and lower extremity weakness and a

ltered mental status could be related to CVA or worsening metastatic brain 

lesion and was started on dexamethasone but on 9/21/2020 MRI/MRA head was 

negative for any acute stroke or any new lesions.  Patient also had a PET scan 

on 7/28/2020 comparison on 5/5/2020 which showed favorable response to therapy, 

increased metabolic activity confined to the primary lung mass.





Patient had outpatient CBC CMP which showed chronic hyponatremia, hypomagnesemia

and hypocalcemia, electrolytes were repleted and patient was sent home.  As 

patient did not improve much she was brought to ED today.





In ED she was noted to have afebrile, normotensive, and SPO2 of 100% on room 

air.  WBC of 39,000 up from 05229 on 2/28/2019. Sodium 126.0 with baseline of 

124-132 and elevated LFTs, otherwise lactic acid, TSH, calcium and magnesium all

within normal limits.





Patient does not have any urinary retention but when in ED a Cuellar patient was 

inserted she produced about 1700 cc of urine.  Urinalysis was negative for any 

infection.





On my encounter patient is comfortably resting in bed, in no apparent distress, 

very pleasant and cooperative with physical examination, she has visible right 

upper and lower extremity involuntary contraction,unfortunately does not 

communicate much due to language barrier, as per Dr. Seay her son patient has 

improved since being admitted to ED.





Dr. Seay her son who was present in the room also mentions that her mother CODE

STATUS is DNR/DNI.





I was able to talk to Dr. Snyder her oncologist who think patient will only 

need rehydration and correction of her electrolyte abnormalities and will not 

need any antibiotics and there is no sign infection even though she has elevated

leukocytosis which explained by her chronic CLL p.o. dexamethasone which was 

started recently. No further imaging recommended.








9/23/20-CT head is negative.  Recent MRI/MRA negative for stroke.  On 

examination patient is having the seizure-like activity affecting the right side

of the body.  Not responding to verbal commands.  Right gaze is fixed.  Patient 

received Keppra IV push, Dilantin IV push, lorazepam as needed to start her on 

Vimpat from this evening.  EEG was requested.  Patient will be n.p.o.  Started 

on IV hydralazine 10 mg every 4 hours as needed for systolic blood pressure more

than 170.  Keep her n.p.o. started on IV Protonix 40 mg twice a day.  

aspiration, seizure precautions will be requested.





9/24/20-patient more alert more awake this morning compared to yesterday.  

Continues to have seizure activity.  Plan is to start her on IV Dilantin 3 mg 

daily, continue Keppra 1 g IV twice a day.  PRN lorazepam IV on board.  To try 

nectar thickened liquids today.  Serum calcium is 6.7, albumin is 2.1.  To give 

2 g of IV calcium today.


Reason For Visit: 


WEAKNESS,DEHYDRATION,PERSISTENT HEMIPARESIS








Physical Exam


Vital Signs: 


                                        











Temp Pulse Resp BP Pulse Ox


 


 98.0 F   79   12   132/91 H  99 


 


 09/24/20 07:56  09/24/20 07:56  09/24/20 07:56  09/24/20 07:56  09/24/20 07:56








                                 Intake & Output











 09/23/20 09/24/20 09/25/20





 06:59 06:59 06:59


 


Intake Total 100 2500 


 


Output Total  1800 


 


Balance 100 700 


 


Weight 38.6 kg 38.6 kg 











General appearance: PRESENT: no acute distress, disheveled, thin


Head exam: PRESENT: atraumatic


Eye exam: PRESENT: conjunctiva pale, PERRLA


Mouth exam: PRESENT: moist, tongue midline


Teeth exam: PRESENT: poor dentation


Neck exam: ABSENT: carotid bruit, JVD, lymphadenopathy, thyromegaly


Respiratory exam: PRESENT: decreased breath sounds


Cardiovascular exam: PRESENT: RRR.  ABSENT: diastolic murmur, rubs, systolic 

murmur


GI/Abdominal exam: PRESENT: normal bowel sounds, soft.  ABSENT: distended, 

guarding, mass, organolmegaly, rebound, tenderness


Rectal exam: PRESENT: deferred


Neurological exam: PRESENT: awake, other - Continue to have seizure activity.





Results


Laboratory Results: 


                                        





                                 09/24/20 06:40 





                                 09/24/20 06:40 





                                        











  09/24/20 09/24/20 09/24/20





  06:40 06:40 06:40


 


WBC  20.7 H  


 


RBC  2.89 L  


 


Hgb  8.8 L  


 


Hct  26.3 L  


 


MCV  91  


 


MCH  30.4  


 


MCHC  33.4  


 


RDW  16.6 H  


 


Plt Count  337  


 


Seg Neutrophils %  Not Reportable  


 


Sodium   128.2 L 


 


Potassium   3.8  D 


 


Chloride   101 


 


Carbon Dioxide   21 L 


 


Anion Gap   6 


 


BUN   14 


 


Creatinine   0.60 


 


Est GFR ( Amer)   > 60 


 


Glucose   109 


 


Calcium   6.7 L* 


 


Total Bilirubin   0.5 


 


AST   49 H 


 


Alkaline Phosphatase   184 H 


 


Total Protein   4.2 L 


 


Albumin   2.1 L 


 


PTH Intact    324.6 H











Impressions: 


                                        





Abdomen/Pelvis CT  09/23/20 00:00


IMPRESSION:  Increasing left pleural effusion.  Complete collapse of left lower 

lobe as described.


 


IMPRESSION:  No acute findings in the abdomen or pelvis.  The study is degraded 

by respiratory motion.


 








Chest CT  09/23/20 00:00


IMPRESSION:  Increasing left pleural effusion.  Complete collapse of left lower 

lobe as described.


 


IMPRESSION:  No acute findings in the abdomen or pelvis.  The study is degraded 

by respiratory motion.


 








Head CT  09/23/20 00:00


IMPRESSION:  1.  Small metastatic foci in the left cerebral hemisphere are again

noted but less apparent on the current study.  This in part is due to lack of IV

contrast.


2.  No intracranial hemorrhage.  No midline shift.


EVIDENCE OF ACUTE STROKE: NO.


 














Assessment and Plan





- Diagnosis


(1) Seizure disorder


Is this a current diagnosis for this admission?: Yes   


Plan: 


9/23/2020-patient having the active continuous seizure activity received IV 

Dilantin, IV Keppra, also on IV lorazepam as needed.  Plan is to start Vimpat 

from evening.  EEG was requested.  Aspiration, seizure precautions will be 

requested.








9/24/2020-to give Dilantin 300 mg IV daily, Keppra thousand milligrams twice 

daily.  EEG yesterday indicates status epilepticus.  Improvement in seizure 

activity is noticed compared to yesterday.  Patient is more alert more awake 

this morning.








(2) Chronic hyponatremia


Is this a current diagnosis for this admission?: Yes   


Plan: 


Chronic hyponatremia.  Likely due to underlying malignancy.


Sodium seems to be at baseline.  Continue normal saline.  Monitor sodium level. 

Monitor for seizure.








9/23/2020-serum sodium is 128.9.  Chronic hyponatremia most likely secondary to 

underlying malignancy.








9/24/2020-serum sodium is 128.2.  Hyponatremia is resolved.








(3) Hypertension


Is this a current diagnosis for this admission?: Yes   


Plan: 


Seems dehydrated.  Mildly hypertensive.


Continue fluid resuscitation guided by volume status.  Resume home meds.  PRN 

hydralazine.  Adjust meds as needed.








9/23/2020-patient has history of chronic essential hypertension systolic blood 

pressure close to 200.  Started on IV hydralazine 10 mg every 4 PRN for systolic

blood pressure more than 170.  It may be a contributing factor for seizure.





9/24/2020-blood pressure this morning is 133/57.  Stable.








(4) Hypocalcemia


Is this a current diagnosis for this admission?: Yes   


Plan: 


9/23/2020-corrected serum calcium is within normal limits.








9/24/2020-serum calcium 6.7, serum albumin is 2.1, PTH is elevated.  To give her

2 g of IV calcium this morning.








(5) Hypomagnesemia


Is this a current diagnosis for this admission?: Yes   


Plan: 


9/23/2020-serum magnesium is 1.7 to give 1 g of IV magnesium at this time.








(6) History of chronic lymphocytic leukemia


Is this a current diagnosis for this admission?: Yes   


Plan: 


History of CLL.  Followed by Dr. Snyder as outpatient.  Presenting with 

leukocytosis but no sign of acute infection.  Vitals WNL.


Patient also started on dexamethasone 4 mg p.o. daily as outpatient which may 

also explain her worsening leukocytosis.





Blood culture.  No antibiotic indicated at this point, will start on empiric IV 

antibiotics guided by clinical symptoms and culture results.  








9/23/2020-patient has history of CLL.  Dr. Snyder on board.  WBC count is 

elevated most likely secondary to CLL.  Afebrile.  Not on antibiotics at this 

time.








9/24/20-WBC count improved to 20,000.  Patient is on cefepime at this time.








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


Qualifiers: 


   Laterality: unspecified laterality   Qualified Code(s): C34.90 - Malignant 

neoplasm of unspecified part of unspecified bronchus or lung   


Is this a current diagnosis for this admission?: Yes   


Plan: 


Recurrent.  Status post chemoradiation.  Currently on chemotherapy for cycle.  

Last chemotherapy 2 weeks ago. Followed by Dr. Snyder as outpatient.





Oncology consulted.  Follow-up recommendations.








(8) Protein-energy malnutrition


Qualifiers: 


   Protein-calorie malnutrition severity: severe   Qualified Code(s): E43 - 

Unspecified severe protein-calorie malnutrition   


Is this a current diagnosis for this admission?: Yes   


Plan: 


9/23/2020-patient BMI is less than 17.  On examination wasting of the temporalis

muscles, quadriceps muscles, deltoid muscle seen.  Patient is n.p.o. at this 

time.  Dietary consult is requested.








- Time


Anticipated Discharge Disposition: Home, Self Care


Anticipated Discharge Timeframe: within 48 hours

## 2020-09-24 NOTE — PDOC PROGRESS REPORT
Subjective


Progress Note for:: 09/24/20


Reason For Visit: 


Patient seen today.  Her son is by the bedside.  She looks more awake alert and 

more responsive to questions in her dialect.  However she still continues to 

have tonic jerking movements of the right half of the body.  Apparently EEG 

showed seizure activity and as per discussions Dr. Seay has had with the 

neurologist he wants to load her up with IV Dilantin followed by increasing dose

of Keppra.  Besides that her calcium is dropping and her magnesium yesterday was

low normal.  Other labs on review shows she has got a high PTH.  Her blood 

pressure was electively stable until this morning when it showed up to 180 

systolic.








Physical Exam


Vital Signs: 


                                        











Temp Pulse Resp BP Pulse Ox


 


 98.1 F   109 H  16   187/71 H  97 


 


 09/24/20 11:31  09/24/20 12:08  09/24/20 12:08  09/24/20 11:31  09/24/20 12:08








                                 Intake & Output











 09/23/20 09/24/20 09/25/20





 06:59 06:59 06:59


 


Intake Total 100 2500 


 


Output Total  1800 


 


Balance 100 700 


 


Weight 38.6 kg 38.6 kg 











General appearance: PRESENT: mild distress


Respiratory exam: PRESENT: clear to auscultation raymundo, decreased breath sounds.  

ABSENT: crackles


Cardiovascular exam: PRESENT: +S1, +S2.  ABSENT: rubs


GI/Abdominal exam: PRESENT: normal bowel sounds, soft.  ABSENT: organomegaly, 

tenderness


Neurological exam: PRESENT: awake, other - Seizure-like activity affecting the 

right half of the body but patient mentation is much better than yesterday.


Skin exam: ABSENT: erythema, mottled, petechiae





Results


Laboratory Results: 


                                        





                                 09/24/20 06:40 





                                 09/24/20 06:40 





                                        











  09/24/20 09/24/20 09/24/20





  06:40 06:40 06:40


 


WBC  20.7 H  


 


RBC  2.89 L  


 


Hgb  8.8 L  


 


Hct  26.3 L  


 


MCV  91  


 


MCH  30.4  


 


MCHC  33.4  


 


RDW  16.6 H  


 


Plt Count  337  


 


Seg Neutrophils %  Not Reportable  


 


Sodium   128.2 L 


 


Potassium   3.8  D 


 


Chloride   101 


 


Carbon Dioxide   21 L 


 


Anion Gap   6 


 


BUN   14 


 


Creatinine   0.60 


 


Est GFR ( Amer)   > 60 


 


Glucose   109 


 


Calcium   6.7 L* 


 


Total Bilirubin   0.5 


 


AST   49 H 


 


Alkaline Phosphatase   184 H 


 


Total Protein   4.2 L 


 


Albumin   2.1 L 


 


PTH Intact    324.6 H











Impressions: 


                                        





Abdomen/Pelvis CT  09/23/20 00:00


IMPRESSION:  Increasing left pleural effusion.  Complete collapse of left lower 

lobe as described.


 


IMPRESSION:  No acute findings in the abdomen or pelvis.  The study is degraded 

by respiratory motion.


 








Chest CT  09/23/20 00:00


IMPRESSION:  Increasing left pleural effusion.  Complete collapse of left lower 

lobe as described.


 


IMPRESSION:  No acute findings in the abdomen or pelvis.  The study is degraded 

by respiratory motion.


 








Head CT  09/23/20 00:00


IMPRESSION:  1.  Small metastatic foci in the left cerebral hemisphere are again

noted but less apparent on the current study.  This in part is due to lack of IV

contrast.


2.  No intracranial hemorrhage.  No midline shift.


EVIDENCE OF ACUTE STROKE: NO.


 














Assessment & Plan





- Diagnosis


(1) Seizure disorder


Is this a current diagnosis for this admission?: Yes   


Plan: 


Even though patient is overall better she still continues to have tonic seizure-

like activity affecting the right half of the body.  However mentation is a 

whole lot better and she is more verbally responsive to questions which is 

encouraging.  As per discussions Dr. Seay has had with neurologist elsewhere 

patient is going to be loaded further with Dilantin followed by increasing dose 

of Keppra.  Hopefully we can control the seizures and postictal state, that  she

will be much more awake and responsive and in a more comfortable position to be 

taken back home.








(2) Hypocalcemia


Is this a current diagnosis for this admission?: Yes   


Plan: 


Low calcium  today and is being replaced intravenously.  Also will be starting 

calcitriol given high PTH.  I believe the patient has got stage IV CKD even 

though her creatinine is normal for lots of reasons.








(3) Hypomagnesemia


Is this a current diagnosis for this admission?: Yes   


Plan: 


Low normal yesterday.  Give a gram of IV mag sulfate today and follow-up with 

labs tomorrow.








(4) Hypertension


Is this a current diagnosis for this admission?: Yes   


Plan: 


Was under good control until this morning when she shot up to 180 systolic.  

Repeat her lab her blood pressure readings and if it is still high will dose her

with 10 mg of IV hydralazine.  Discussed with nurse.








(5) Leukocytosis, unspecified


Qualifiers: 


   Leukocytosis type: unspecified   Qualified Code(s): D72.829 - Elevated white 

blood cell count, unspecified   


Plan: 


Improving.








(6) Malnutrition


Qualifiers: 


   Malnutrition type: protein-calorie malnutrition   Protein-calorie 

malnutrition severity: moderate   Qualified Code(s): E44.0 - Moderate protein-

calorie malnutrition   


Is this a current diagnosis for this admission?: Yes   


Plan: 


Status quo.








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


Qualifiers: 


   Laterality: unspecified laterality   Qualified Code(s): C34.90 - Malignant 

neoplasm of unspecified part of unspecified bronchus or lung   


Is this a current diagnosis for this admission?: Yes   


Plan: 


With brain mets.  As per Dr. Snyder/mervin oncologist.I think the overall 

consensus that is being reached by the family as well as with the treating phys

icians especially Dr. Snyder/mervin oncologist is that she is reaching 

end-of-life situation and hospice should be consulted.  Her son  Seay is 

also the opinion that no further intense or heroic measures should be 

instituted.  She is already a DNR/DNI.I completely agree with this assessment.








(8) Urinary retention


Is this a current diagnosis for this admission?: Yes   


Plan: 


Resolved with placement of Cuellar catheter.

## 2020-09-25 LAB
%HYPO/RBC NFR BLD AUTO: SLIGHT %
ABSOLUTE LYMPHOCYTES# (MANUAL): 4.4 10^3/UL (ref 0.5–4.7)
ABSOLUTE MONOCYTES # (MANUAL): 0.7 10^3/UL (ref 0.1–1.4)
ADD MANUAL DIFF: YES
ALBUMIN SERPL-MCNC: 2.2 G/DL (ref 3.5–5)
ALP SERPL-CCNC: 180 U/L (ref 38–126)
ANION GAP SERPL CALC-SCNC: 8 MMOL/L (ref 5–19)
ANISOCYTOSIS BLD QL SMEAR: (no result)
AST SERPL-CCNC: 40 U/L (ref 14–36)
BASOPHILS NFR BLD MANUAL: 0 % (ref 0–2)
BILIRUB DIRECT SERPL-MCNC: 0.4 MG/DL (ref 0–0.4)
BILIRUB SERPL-MCNC: 0.5 MG/DL (ref 0.2–1.3)
BUN SERPL-MCNC: 11 MG/DL (ref 7–20)
CALCIUM: 6.9 MG/DL (ref 8.4–10.2)
CHLORIDE SERPL-SCNC: 101 MMOL/L (ref 98–107)
CO2 SERPL-SCNC: 19 MMOL/L (ref 22–30)
EOSINOPHIL NFR BLD MANUAL: 0 % (ref 0–6)
ERYTHROCYTE [DISTWIDTH] IN BLOOD BY AUTOMATED COUNT: 16.7 % (ref 11.5–14)
GLUCOSE SERPL-MCNC: 54 MG/DL (ref 75–110)
HCT VFR BLD CALC: 28.8 % (ref 36–47)
HGB BLD-MCNC: 9.4 G/DL (ref 12–15.5)
MCH RBC QN AUTO: 29.7 PG (ref 27–33.4)
MCHC RBC AUTO-ENTMCNC: 32.7 G/DL (ref 32–36)
MCV RBC AUTO: 91 FL (ref 80–97)
MONOCYTES % (MANUAL): 3 % (ref 3–13)
PLATELET # BLD: 394 10^3/UL (ref 150–450)
PLATELET COMMENT: ADEQUATE
POLYCHROMASIA BLD QL SMEAR: (no result)
POTASSIUM SERPL-SCNC: 3.7 MMOL/L (ref 3.6–5)
PROT SERPL-MCNC: 4.5 G/DL (ref 6.3–8.2)
RBC # BLD AUTO: 3.18 10^6/UL (ref 3.72–5.28)
SEGMENTED NEUTROPHILS % (MAN): 78 % (ref 42–78)
TOTAL CELLS COUNTED BLD: 100
VARIANT LYMPHS NFR BLD MANUAL: 19 % (ref 13–45)
WBC # BLD AUTO: 22.9 10^3/UL (ref 4–10.5)

## 2020-09-25 RX ADMIN — CALCITRIOL SCH: 0.25 CAPSULE, LIQUID FILLED ORAL at 10:29

## 2020-09-25 RX ADMIN — PANTOPRAZOLE SODIUM SCH MG: 40 INJECTION, POWDER, FOR SOLUTION INTRAVENOUS at 09:50

## 2020-09-25 RX ADMIN — FOLIC ACID SCH: 1 TABLET ORAL at 09:43

## 2020-09-25 RX ADMIN — LEVETIRACETAM SCH MLS/HR: 10 INJECTION INTRAVENOUS at 22:13

## 2020-09-25 RX ADMIN — SODIUM CHLORIDE PRN MLS/HR: 9 INJECTION, SOLUTION INTRAVENOUS at 03:35

## 2020-09-25 RX ADMIN — PHENYTOIN SODIUM SCH MG: 50 INJECTION INTRAMUSCULAR; INTRAVENOUS at 11:11

## 2020-09-25 RX ADMIN — PANTOPRAZOLE SODIUM SCH MG: 40 INJECTION, POWDER, FOR SOLUTION INTRAVENOUS at 22:13

## 2020-09-25 RX ADMIN — CEFEPIME HYDROCHLORIDE SCH MLS/HR: 1 INJECTION, SOLUTION INTRAVENOUS at 05:59

## 2020-09-25 RX ADMIN — LEVETIRACETAM SCH MLS/HR: 10 INJECTION INTRAVENOUS at 09:50

## 2020-09-25 RX ADMIN — DEXAMETHASONE SODIUM PHOSPHATE SCH MG: 4 INJECTION, SOLUTION INTRAMUSCULAR; INTRAVENOUS at 09:50

## 2020-09-25 RX ADMIN — CYANOCOBALAMIN SCH MCG: 1000 INJECTION, SOLUTION INTRAMUSCULAR; SUBCUTANEOUS at 19:21

## 2020-09-25 RX ADMIN — CEFEPIME HYDROCHLORIDE SCH MLS/HR: 1 INJECTION, SOLUTION INTRAVENOUS at 19:21

## 2020-09-25 NOTE — PDOC PROGRESS REPORT
Subjective


Progress Note for:: 09/25/20


Subjective:: 





74 year old female past medical history of hypertension, hypothyroidism, chronic

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 

brain status post chemoradiation, recently restarted on chemotherapy, currently 

on fifth cycle, last chemotherapy 2 weeks ago, who was sent to ED by her 

oncologist Dr. Snyder for evaluation of generalized weakness, right upper and 

lower extremity weakness, altered mental status, and electrolyte abnormalities.





Source of history is Dr. Seay her son who is present in the room, as per son 

patient was at her baseline ambulating with the help of her  and p.o. 

tolerant, but recently patient has been noted to being more altered,having very 

low appetite with generalized weakness and right-sided upper and lower extremity

weakness and involuntary contractions.  Patient has not had any exposure to COVI

D, is not complaining of any nausea, vomiting, diarrhea, constipation, fever, 

chills, chest pain, shortness of breath.





Initially it was thought that her right upper and lower extremity weakness and a

ltered mental status could be related to CVA or worsening metastatic brain 

lesion and was started on dexamethasone but on 9/21/2020 MRI/MRA head was 

negative for any acute stroke or any new lesions.  Patient also had a PET scan 

on 7/28/2020 comparison on 5/5/2020 which showed favorable response to therapy, 

increased metabolic activity confined to the primary lung mass.





Patient had outpatient CBC CMP which showed chronic hyponatremia, hypomagnesemia

and hypocalcemia, electrolytes were repleted and patient was sent home.  As 

patient did not improve much she was brought to ED today.





In ED she was noted to have afebrile, normotensive, and SPO2 of 100% on room 

air.  WBC of 39,000 up from 02790 on 2/28/2019. Sodium 126.0 with baseline of 

124-132 and elevated LFTs, otherwise lactic acid, TSH, calcium and magnesium all

within normal limits.





Patient does not have any urinary retention but when in ED a [facility] patient was 

inserted she produced about 1700 cc of urine.  Urinalysis was negative for any 

infection.





On my encounter patient is comfortably resting in bed, in no apparent distress, 

very pleasant and cooperative with physical examination, she has visible right 

upper and lower extremity involuntary contraction,unfortunately does not 

communicate much due to language barrier, as per Dr. Seay her son patient has 

improved since being admitted to ED.





Dr. Seay her son who was present in the room also mentions that her mother CODE

STATUS is DNR/DNI.





I was able to talk to Dr. Snyder her oncologist who think patient will only 

need rehydration and correction of her electrolyte abnormalities and will not 

need any antibiotics and there is no sign infection even though she has elevated

leukocytosis which explained by her chronic CLL p.o. dexamethasone which was 

started recently. No further imaging recommended.








9/23/20-CT head is negative.  Recent MRI/MRA negative for stroke.  On 

examination patient is having the seizure-like activity affecting the right side

of the body.  Not responding to verbal commands.  Right gaze is fixed.  Patient 

received Keppra IV push, Dilantin IV push, lorazepam as needed to start her on 

Vimpat from this evening.  EEG was requested.  Patient will be n.p.o.  Started 

on IV hydralazine 10 mg every 4 hours as needed for systolic blood pressure more

than 170.  Keep her n.p.o. started on IV Protonix 40 mg twice a day.  

aspiration, seizure precautions will be requested.





9/24/20-patient more alert more awake this morning compared to yesterday.  

Continues to have seizure activity.  Plan is to start her on IV Dilantin 3 mg 

daily, continue Keppra 1 g IV twice a day.  PRN lorazepam IV on board.  To try 

nectar thickened liquids today.  Serum calcium is 6.7, albumin is 2.1.  To give 

2 g of IV calcium today.








9/25/2020-patient is still having the continuous seizures.  Unable to take 

anything by mouth.  Blood sugars dropped to 54 with decrease IV fluids to D5 

normal saline at this time.  To give a vitamin B12 injections.  Dilantin was 

increased to 400 mg IV daily.  Repeat EEG was done.  Latest blood pressure is 

142/60.  Stable.


Reason For Visit: 


WEAKNESS,DEHYDRATION,PERSISTENT HEMIPARESIS








Physical Exam


Vital Signs: 


                                        











Temp Pulse Resp BP Pulse Ox


 


 97.5 F   83   18   186/60 H  98 


 


 09/24/20 19:52  09/25/20 07:00  09/24/20 19:52  09/24/20 19:52  09/24/20 19:52








                                 Intake & Output











 09/24/20 09/25/20 09/26/20





 06:59 06:59 06:59


 


Intake Total 2500 2400 


 


Output Total 1800 1425 


 


Balance 700 975 


 


Weight 38.6 kg 38.6 kg 











General appearance: PRESENT: no acute distress, disheveled, thin


Head exam: PRESENT: atraumatic


Eye exam: PRESENT: PERRLA


Mouth exam: PRESENT: moist, tongue midline


Teeth exam: PRESENT: poor dentation


Neck exam: ABSENT: carotid bruit, JVD, lymphadenopathy, thyromegaly


Respiratory exam: PRESENT: decreased breath sounds


Cardiovascular exam: PRESENT: RRR.  ABSENT: diastolic murmur, rubs, systolic 

murmur


Pulses: PRESENT: normal dorsalis pedis pul


GI/Abdominal exam: PRESENT: normal bowel sounds, soft.  ABSENT: distended, 

guarding, mass, organolmegaly, rebound, tenderness


Rectal exam: PRESENT: deferred


Extremities exam: PRESENT: full ROM.  ABSENT: calf tenderness, clubbing, pedal 

edema


Neurological exam: PRESENT: altered, other - Patient may have a right-sided 

weakness.  Continue seizure activity affecting the right side of the body.





Results


Laboratory Results: 


                                        





                                 09/25/20 06:05 





                                 09/25/20 06:05 





                                        











  09/25/20 09/25/20





  06:05 06:05


 


WBC  22.9 H 


 


RBC  3.18 L 


 


Hgb  9.4 L 


 


Hct  28.8 L 


 


MCV  91 


 


MCH  29.7 


 


MCHC  32.7 


 


RDW  16.7 H 


 


Plt Count  394 


 


Seg Neutrophils %  Not Reportable 


 


Sodium   127.8 L


 


Potassium   3.7


 


Chloride   101


 


Carbon Dioxide   19 L


 


Anion Gap   8


 


BUN   11


 


Creatinine   0.50 L


 


Est GFR (African Amer)   > 60


 


Glucose   54 L


 


Calcium   6.9 L*


 


Magnesium   1.9


 


Total Bilirubin   0.5


 


AST   40 H


 


Alkaline Phosphatase   180 H


 


Total Protein   4.5 L


 


Albumin   2.2 L











Impressions: 


                                        





Abdomen/Pelvis CT  09/23/20 00:00


IMPRESSION:  Increasing left pleural effusion.  Complete collapse of left lower 

lobe as described.


 


IMPRESSION:  No acute findings in the abdomen or pelvis.  The study is degraded 

by respiratory motion.


 








Chest CT  09/23/20 00:00


IMPRESSION:  Increasing left pleural effusion.  Complete collapse of left lower 

lobe as described.


 


IMPRESSION:  No acute findings in the abdomen or pelvis.  The study is degraded 

by respiratory motion.


 








Head CT  09/23/20 00:00


IMPRESSION:  1.  Small metastatic foci in the left cerebral hemisphere are again

noted but less apparent on the current study.  This in part is due to lack of IV

contrast.


2.  No intracranial hemorrhage.  No midline shift.


EVIDENCE OF ACUTE STROKE: NO.


 














Assessment and Plan





- Diagnosis


(1) Seizure disorder


Is this a current diagnosis for this admission?: Yes   


Plan: 


9/23/2020-patient having the active continuous seizure activity received IV 

Dilantin, IV Keppra, also on IV lorazepam as needed.  Plan is to start Vimpat 

from evening.  EEG was requested.  Aspiration, seizure precautions will be 

requested.








9/24/2020-to give Dilantin 300 mg IV daily, Keppra thousand milligrams twice 

daily.  EEG yesterday indicates status epilepticus.  Improvement in seizure 

activity is noticed compared to yesterday.  Patient is more alert more awake 

this morning.








9/25/20-plan is to increase the Dilantin to 400 mg IV daily.  On Keppra 1000 mg 

twice daily.  Repeat EKG was done today.  Continuous seizure activity is noted. 

Patient is n.p.o. at this time.








(2) Chronic hyponatremia


Is this a current diagnosis for this admission?: Yes   


Plan: 


Chronic hyponatremia.  Likely due to underlying malignancy.


Sodium seems to be at baseline.  Continue normal saline.  Monitor sodium level. 

Monitor for seizure.








9/23/2020-serum sodium is 128.9.  Chronic hyponatremia most likely secondary to 

underlying malignancy.








9/24/2020-serum sodium is 128.2.  Hyponatremia is resolved.








9/25/20-serum sodium is 127.8.  Chronic hyponatremia most likely secondary to 

poor oral intake and underlying malignancy.








(3) Hypertension


Is this a current diagnosis for this admission?: Yes   


Plan: 


Seems dehydrated.  Mildly hypertensive.


Continue fluid resuscitation guided by volume status.  Resume home meds.  PRN 

hydralazine.  Adjust meds as needed.








9/23/2020-patient has history of chronic essential hypertension systolic blood 

pressure close to 200.  Started on IV hydralazine 10 mg every 4 PRN for systolic

blood pressure more than 170.  It may be a contributing factor for seizure.





9/24/2020-blood pressure this morning is 133/57.  Stable.





1/25/2020-latest blood pressure is 148/60.  Stable.








(4) Hypocalcemia


Is this a current diagnosis for this admission?: Yes   


Plan: 


9/23/2020-corrected serum calcium is within normal limits.








9/24/2020-serum calcium 6.7, serum albumin is 2.1, PTH is elevated.  To give her

2 g of IV calcium this morning.








9/25/2020-serum calcium is 6.9.  Albumin is 2.2.  Corrected calcium is within 

normal limits.








(5) Hypomagnesemia


Is this a current diagnosis for this admission?: Yes   


Plan: 


9/23/2020-serum magnesium is 1.7 to give 1 g of IV magnesium at this time.








9/25/2020-serum magnesium is 1.9 today.  To closely monitor the magnesium 

levels.








(6) History of chronic lymphocytic leukemia


Is this a current diagnosis for this admission?: Yes   


Plan: 


History of CLL.  Followed by Dr. Snyder as outpatient.  Presenting with 

leukocytosis but no sign of acute infection.  Vitals WNL.


Patient also started on dexamethasone 4 mg p.o. daily as outpatient which may 

also explain her worsening leukocytosis.





Blood culture.  No antibiotic indicated at this point, will start on empiric IV 

antibiotics guided by clinical symptoms and culture results.  








9/23/2020-patient has history of CLL.  Dr. Snyder on board.  WBC count is 

elevated most likely secondary to CLL.  Afebrile.  Not on antibiotics at this 

time.








9/24/20-WBC count improved to 20,000.  Patient is on cefepime at this time.








9/25/20-WBC count is more than 22,000.  Stable








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


Qualifiers: 


   Laterality: unspecified laterality   Qualified Code(s): C34.90 - Malignant 

neoplasm of unspecified part of unspecified bronchus or lung   


Is this a current diagnosis for this admission?: Yes   


Plan: 


Recurrent.  Status post chemoradiation.  Currently on chemotherapy for cycle.  

Last chemotherapy 2 weeks ago. Followed by Dr. Snyder as outpatient.





Oncology consulted.  Follow-up recommendations.








(8) Protein-energy malnutrition


Qualifiers: 


   Protein-calorie malnutrition severity: severe   Qualified Code(s): E43 - 

Unspecified severe protein-calorie malnutrition   


Is this a current diagnosis for this admission?: Yes   





- Time


Anticipated Discharge Disposition: Home, Self Care


Anticipated Discharge Timeframe: within 48 hours

## 2020-09-25 NOTE — NEURO WORKBENCH EEG REPORT
EEG Report



Patient: Tash Seay

ID: [id number]

Referring Doctor: Amari Snyder

DOS: 09/25/2020



Medications: calcitrol, maxipime, clonidine, folvite, keppra, protonix, 
dilantin, transderm-scop



History

This is a 74 year old right handed female with a history of thyroid disease, 
right paraplegia, hypertension, stage 4 lung cancer with brain metastases, GERD,
anemia. Had EEG on 09/23/2020 that showed status epilepticus. This EEG was 
requested for ongoing seizures.



EEG Interpretation



This EEG was recorded in the awake state only. This was a technically poor study
that impaired interpretation. The awake EEG is characterized by a dis-organized 
background without a noted posterior dominant rhythm. There was no reactivity to
passive eye opening/closing. The remainder of the background was characterized 
by a combination of primarily theta and delta with some alpha frequencies. There
was nearly continuous generalized periodic discharges at approximately 1Hz 
frequency that appeared correlated with right arm and head clonic/myoclonic 
jerks. Photic stimulation resulted in no significant changes. The EKG showed 
periods of a regular rhythm but was infiltrated with artifact impairing 
interpretation.



EEG Classification



* Electro-clinical seizures

* Generalized periodic discharges

* Disorganized

* Technically poor study





EEG Impression



This EEG is severely abnormal. There are continuous generalized periodic 
discharges that are clinically correlated with clonic/myoclonic movements. 
Compared to the prior EEG dated 09/23/2020 it is similar. These results were 
discussed with Dr. Cynthia Snyder at approximately 11:30am eastern time.



INTERPRETING NEUROLOGIST:  Adalgisa Kaye MD, FRCPC

Board Certified in Neurology, with special qualification in Child Neurology, and
in Clinical Neurophysiology

[facility]

## 2020-09-25 NOTE — PDOC PROGRESS REPORT
Subjective


Progress Note for:: 09/25/20


Subjective:: 


Overnight seizure activity seem to be better.  She is having EEG this morning.  

Dilantin was increased to 500 mg.





Reason For Visit: 


WEAKNESS,DEHYDRATION,PERSISTENT HEMIPARESIS








Physical Exam


Vital Signs: 


                                        











Temp Pulse Resp BP Pulse Ox


 


 97.5 F   83   18   186/60 H  98 


 


 09/24/20 19:52  09/25/20 07:00  09/24/20 19:52  09/24/20 19:52  09/24/20 19:52








                                 Intake & Output











 09/24/20 09/25/20 09/26/20





 06:59 06:59 06:59


 


Intake Total 2500 2400 


 


Output Total 1800 1425 


 


Balance 700 975 


 


Weight 38.6 kg 38.6 kg 











General appearance: PRESENT: no acute distress, well-developed, well-nourished


Head exam: PRESENT: atraumatic, normocephalic


Eye exam: PRESENT: conjunctiva pink, EOMI, PERRLA.  ABSENT: scleral icterus


Ear exam: PRESENT: normal external ear exam


Mouth exam: PRESENT: moist, tongue midline


Neck exam: ABSENT: carotid bruit, JVD, lymphadenopathy, thyromegaly


Respiratory exam: PRESENT: clear to auscultation raymundo.  ABSENT: rales, rhonchi, 

wheezes


Cardiovascular exam: PRESENT: RRR.  ABSENT: diastolic murmur, rubs, systolic 

murmur


Pulses: PRESENT: normal dorsalis pedis pul


Vascular exam: PRESENT: normal capillary refill


GI/Abdominal exam: PRESENT: normal bowel sounds, soft.  ABSENT: distended, 

guarding, mass, organolmegaly, rebound, tenderness


Rectal exam: PRESENT: deferred


Extremities exam: PRESENT: full ROM.  ABSENT: calf tenderness, clubbing, pedal 

edema


Neurological exam: PRESENT: alert, awake, oriented to person, oriented to place,

oriented to time, oriented to situation, CN II-XII grossly intact.  ABSENT: 

motor sensory deficit


Psychiatric exam: PRESENT: appropriate affect, normal mood.  ABSENT: homicidal 

ideation, suicidal ideation


Skin exam: PRESENT: dry, intact, warm.  ABSENT: cyanosis, rash





Results


Laboratory Results: 


                                        





                                 09/25/20 06:05 





                                 09/25/20 06:05 





                                        











  09/25/20 09/25/20





  06:05 06:05


 


WBC  22.9 H 


 


RBC  3.18 L 


 


Hgb  9.4 L 


 


Hct  28.8 L 


 


MCV  91 


 


MCH  29.7 


 


MCHC  32.7 


 


RDW  16.7 H 


 


Plt Count  394 


 


Seg Neutrophils %  Not Reportable 


 


Sodium   127.8 L


 


Potassium   3.7


 


Chloride   101


 


Carbon Dioxide   19 L


 


Anion Gap   8


 


BUN   11


 


Creatinine   0.50 L


 


Est GFR (African Amer)   > 60


 


Glucose   54 L


 


Calcium   6.9 L*


 


Magnesium   1.9


 


Total Bilirubin   0.5


 


AST   40 H


 


Alkaline Phosphatase   180 H


 


Total Protein   4.5 L


 


Albumin   2.2 L











Impressions: 


                                        





Abdomen/Pelvis CT  09/23/20 00:00


IMPRESSION:  Increasing left pleural effusion.  Complete collapse of left lower 

lobe as described.


 


IMPRESSION:  No acute findings in the abdomen or pelvis.  The study is degraded 

by respiratory motion.


 








Chest CT  09/23/20 00:00


IMPRESSION:  Increasing left pleural effusion.  Complete collapse of left lower 

lobe as described.


 


IMPRESSION:  No acute findings in the abdomen or pelvis.  The study is degraded 

by respiratory motion.


 








Head CT  09/23/20 00:00


IMPRESSION:  1.  Small metastatic foci in the left cerebral hemisphere are again

noted but less apparent on the current study.  This in part is due to lack of IV

contrast.


2.  No intracranial hemorrhage.  No midline shift.


EVIDENCE OF ACUTE STROKE: NO.


 














Assessment & Plan





- Diagnosis


(1) Generalized weakness


Is this a current diagnosis for this admission?: Yes   


Plan: 


Still pretty severe.  Continue to follow.








(2) Hemiparesis


Qualifiers: 


   Hemiparesis etiology: unspecified   Hemiparesis laterality: right dominant 

side   Qualified Code(s): G81.91 - Hemiplegia, unspecified affecting right 

dominant side   


Is this a current diagnosis for this admission?: Yes   


Plan: 


Likely secondary to the seizure activity and postictal phase that is continued








(3) Chronic hyponatremia


Is this a current diagnosis for this admission?: Yes   


Plan: 


Followed by nephrology








(4) Lung cancer


Qualifiers: 


   Laterality: left   Lung location: lower lobe of lung   Qualified Code(s): 

C34.32 - Malignant neoplasm of lower lobe, left bronchus or lung   


Is this a current diagnosis for this admission?: Yes   


Plan: 


No further treatment planned.  We will try and give the patient another 2 days 

to see how she does, to see if she can recover some mental status and physical 

ability, by Monday if we see that seizure activity is similar even with higher 

dose Dilantin, we may consider a more comfort care approach.








(5) Status epilepticus due to complex partial seizure


Is this a current diagnosis for this admission?: Yes   


Plan: 


Increase Dilantin to 500 mg IV daily.








- Time


Time Spent with patient: 35 or more minutes

## 2020-09-26 LAB
ABSOLUTE LYMPHOCYTES# (MANUAL): 3.9 10^3/UL (ref 0.5–4.7)
ABSOLUTE MONOCYTES # (MANUAL): 0.6 10^3/UL (ref 0.1–1.4)
ADD MANUAL DIFF: YES
ALBUMIN SERPL-MCNC: 2.2 G/DL (ref 3.5–5)
ALP SERPL-CCNC: 159 U/L (ref 38–126)
ANION GAP SERPL CALC-SCNC: 7 MMOL/L (ref 5–19)
ANISOCYTOSIS BLD QL SMEAR: (no result)
AST SERPL-CCNC: 30 U/L (ref 14–36)
BASOPHILS NFR BLD MANUAL: 0 % (ref 0–2)
BILIRUB DIRECT SERPL-MCNC: 0.3 MG/DL (ref 0–0.4)
BILIRUB SERPL-MCNC: 0.4 MG/DL (ref 0.2–1.3)
BUN SERPL-MCNC: 6 MG/DL (ref 7–20)
CALCIUM: 6.2 MG/DL (ref 8.4–10.2)
CHLORIDE SERPL-SCNC: 96 MMOL/L (ref 98–107)
CO2 SERPL-SCNC: 20 MMOL/L (ref 22–30)
EOSINOPHIL NFR BLD MANUAL: 0 % (ref 0–6)
ERYTHROCYTE [DISTWIDTH] IN BLOOD BY AUTOMATED COUNT: 16.5 % (ref 11.5–14)
GLUCOSE SERPL-MCNC: 121 MG/DL (ref 75–110)
HCT VFR BLD CALC: 27.4 % (ref 36–47)
HGB BLD-MCNC: 9.3 G/DL (ref 12–15.5)
MCH RBC QN AUTO: 30.1 PG (ref 27–33.4)
MCHC RBC AUTO-ENTMCNC: 33.9 G/DL (ref 32–36)
MCV RBC AUTO: 89 FL (ref 80–97)
MONOCYTES % (MANUAL): 3 % (ref 3–13)
OVALOCYTES BLD QL SMEAR: SLIGHT
PLATELET # BLD: 366 10^3/UL (ref 150–450)
PLATELET COMMENT: ADEQUATE
POTASSIUM SERPL-SCNC: 2.7 MMOL/L (ref 3.6–5)
PROT SERPL-MCNC: 4.3 G/DL (ref 6.3–8.2)
RBC # BLD AUTO: 3.09 10^6/UL (ref 3.72–5.28)
SEGMENTED NEUTROPHILS % (MAN): 78 % (ref 42–78)
TOTAL CELLS COUNTED BLD: 100
VARIANT LYMPHS NFR BLD MANUAL: 19 % (ref 13–45)
WBC # BLD AUTO: 20.5 10^3/UL (ref 4–10.5)

## 2020-09-26 RX ADMIN — CEFEPIME HYDROCHLORIDE SCH MLS/HR: 1 INJECTION, SOLUTION INTRAVENOUS at 17:41

## 2020-09-26 RX ADMIN — LACOSAMIDE SCH MG: 10 INJECTION INTRAVENOUS at 10:54

## 2020-09-26 RX ADMIN — MAGNESIUM SULFATE IN DEXTROSE SCH MLS/HR: 10 INJECTION, SOLUTION INTRAVENOUS at 10:54

## 2020-09-26 RX ADMIN — PANTOPRAZOLE SODIUM SCH MG: 40 INJECTION, POWDER, FOR SOLUTION INTRAVENOUS at 21:18

## 2020-09-26 RX ADMIN — ALBUMIN (HUMAN) SCH MLS/HR: 12.5 SOLUTION INTRAVENOUS at 16:11

## 2020-09-26 RX ADMIN — ALBUMIN (HUMAN) SCH MLS/HR: 12.5 SOLUTION INTRAVENOUS at 13:04

## 2020-09-26 RX ADMIN — POTASSIUM CHLORIDE SCH: 29.8 INJECTION, SOLUTION INTRAVENOUS at 15:25

## 2020-09-26 RX ADMIN — CALCIUM GLUCONATE SCH MLS/HR: 20 INJECTION, SOLUTION INTRAVENOUS at 19:26

## 2020-09-26 RX ADMIN — MAGNESIUM SULFATE IN DEXTROSE SCH MLS/HR: 10 INJECTION, SOLUTION INTRAVENOUS at 11:57

## 2020-09-26 RX ADMIN — PHENYTOIN SODIUM SCH MG: 50 INJECTION INTRAMUSCULAR; INTRAVENOUS at 09:51

## 2020-09-26 RX ADMIN — LEVETIRACETAM SCH MLS/HR: 10 INJECTION INTRAVENOUS at 10:04

## 2020-09-26 RX ADMIN — ALBUMIN (HUMAN) SCH MLS/HR: 12.5 SOLUTION INTRAVENOUS at 15:22

## 2020-09-26 RX ADMIN — LACOSAMIDE SCH MG: 10 INJECTION INTRAVENOUS at 17:41

## 2020-09-26 RX ADMIN — PANTOPRAZOLE SODIUM SCH MG: 40 INJECTION, POWDER, FOR SOLUTION INTRAVENOUS at 09:41

## 2020-09-26 RX ADMIN — POTASSIUM CHLORIDE SCH MLS/HR: 29.8 INJECTION, SOLUTION INTRAVENOUS at 10:55

## 2020-09-26 RX ADMIN — ALBUMIN (HUMAN) SCH MLS/HR: 12.5 SOLUTION INTRAVENOUS at 14:22

## 2020-09-26 RX ADMIN — CALCITRIOL SCH: 0.25 CAPSULE, LIQUID FILLED ORAL at 09:34

## 2020-09-26 RX ADMIN — CALCIUM GLUCONATE SCH: 20 INJECTION, SOLUTION INTRAVENOUS at 15:41

## 2020-09-26 RX ADMIN — FOLIC ACID SCH: 1 TABLET ORAL at 09:34

## 2020-09-26 RX ADMIN — DEXAMETHASONE SODIUM PHOSPHATE SCH MG: 4 INJECTION, SOLUTION INTRAMUSCULAR; INTRAVENOUS at 09:41

## 2020-09-26 RX ADMIN — CEFEPIME HYDROCHLORIDE SCH MLS/HR: 1 INJECTION, SOLUTION INTRAVENOUS at 05:47

## 2020-09-26 RX ADMIN — CALCIUM GLUCONATE SCH MLS/HR: 20 INJECTION, SOLUTION INTRAVENOUS at 18:16

## 2020-09-26 NOTE — PDOC PROGRESS REPORT
Subjective


Progress Note for:: 09/26/20


Subjective:: 





74 year old female past medical history of hypertension, hypothyroidism, chronic

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 

brain status post chemoradiation, recently restarted on chemotherapy, currently 

on fifth cycle, last chemotherapy 2 weeks ago, who was sent to ED by her 

oncologist Dr. Snyder for evaluation of generalized weakness, right upper and 

lower extremity weakness, altered mental status, and electrolyte abnormalities.





Source of history is Dr. Seay her son who is present in the room, as per son 

patient was at her baseline ambulating with the help of her  and p.o. 

tolerant, but recently patient has been noted to being more altered,having very 

low appetite with generalized weakness and right-sided upper and lower extremity

weakness and involuntary contractions.  Patient has not had any exposure to COVI

D, is not complaining of any nausea, vomiting, diarrhea, constipation, fever, 

chills, chest pain, shortness of breath.





Initially it was thought that her right upper and lower extremity weakness and a

ltered mental status could be related to CVA or worsening metastatic brain 

lesion and was started on dexamethasone but on 9/21/2020 MRI/MRA head was 

negative for any acute stroke or any new lesions.  Patient also had a PET scan 

on 7/28/2020 comparison on 5/5/2020 which showed favorable response to therapy, 

increased metabolic activity confined to the primary lung mass.





Patient had outpatient CBC CMP which showed chronic hyponatremia, hypomagnesemia

and hypocalcemia, electrolytes were repleted and patient was sent home.  As 

patient did not improve much she was brought to ED today.





In ED she was noted to have afebrile, normotensive, and SPO2 of 100% on room 

air.  WBC of 39,000 up from 49121 on 2/28/2019. Sodium 126.0 with baseline of 

124-132 and elevated LFTs, otherwise lactic acid, TSH, calcium and magnesium all

within normal limits.





Patient does not have any urinary retention but when in ED a [facility] patient was 

inserted she produced about 1700 cc of urine.  Urinalysis was negative for any 

infection.





On my encounter patient is comfortably resting in bed, in no apparent distress, 

very pleasant and cooperative with physical examination, she has visible right 

upper and lower extremity involuntary contraction,unfortunately does not 

communicate much due to language barrier, as per Dr. Seay her son patient has 

improved since being admitted to ED.





Dr. Seay her son who was present in the room also mentions that her mother CODE

STATUS is DNR/DNI.





I was able to talk to Dr. Snyder her oncologist who think patient will only 

need rehydration and correction of her electrolyte abnormalities and will not 

need any antibiotics and there is no sign infection even though she has elevated

leukocytosis which explained by her chronic CLL p.o. dexamethasone which was 

started recently. No further imaging recommended.








9/23/20-CT head is negative.  Recent MRI/MRA negative for stroke.  On 

examination patient is having the seizure-like activity affecting the right side

of the body.  Not responding to verbal commands.  Right gaze is fixed.  Patient 

received Keppra IV push, Dilantin IV push, lorazepam as needed to start her on 

Vimpat from this evening.  EEG was requested.  Patient will be n.p.o.  Started 

on IV hydralazine 10 mg every 4 hours as needed for systolic blood pressure more

than 170.  Keep her n.p.o. started on IV Protonix 40 mg twice a day.  

aspiration, seizure precautions will be requested.





9/24/20-patient more alert more awake this morning compared to yesterday.  

Continues to have seizure activity.  Plan is to start her on IV Dilantin 3 mg 

daily, continue Keppra 1 g IV twice a day.  PRN lorazepam IV on board.  To try 

nectar thickened liquids today.  Serum calcium is 6.7, albumin is 2.1.  To give 

2 g of IV calcium today.








9/25/2020-patient is still having the continuous seizures.  Unable to take 

anything by mouth.  Blood sugars dropped to 54 with decrease IV fluids to D5 

normal saline at this time.  To give a vitamin B12 injections.  Dilantin was 

increased to 400 mg IV daily.  Repeat EEG was done.  Latest blood pressure is 

142/60.  Stable.








9/26/20-patient is continued to have seizure activity affecting the right side 

of the body.  Patient is n.p.o.  Serum magnesium is low, serum potassium is low 

to supplement potassium and magnesium.  Serum calcium is also low.  To give IV 

calcium this morning.  Sodium is dropping to 123 to change IV fluids from D5 

normal saline to normal saline at 75 cc/h.


Reason For Visit: 


WEAKNESS,DEHYDRATION,PERSISTENT HEMIPARESIS








Physical Exam


Vital Signs: 


                                        











Temp Pulse Resp BP Pulse Ox


 


 99.0 F   102 H  20   179/101 H  95 


 


 09/26/20 08:00  09/26/20 08:00  09/26/20 08:00  09/26/20 08:00  09/26/20 08:00








                                 Intake & Output











 09/25/20 09/26/20 09/27/20





 06:59 06:59 06:59


 


Intake Total 2400 1300 


 


Output Total 1425 1650 


 


Balance 975 -350 


 


Weight 38.6 kg 38.6 kg 











General appearance: PRESENT: no acute distress, disheveled, thin


Head exam: PRESENT: atraumatic


Eye exam: PRESENT: conjunctiva pale


Mouth exam: PRESENT: moist, tongue midline


Teeth exam: PRESENT: poor dentation


Neck exam: ABSENT: carotid bruit, JVD, lymphadenopathy, thyromegaly


Respiratory exam: PRESENT: decreased breath sounds


Cardiovascular exam: PRESENT: tachycardia


GI/Abdominal exam: PRESENT: normal bowel sounds, soft.  ABSENT: distended, 

guarding, mass, organolmegaly, rebound, tenderness


Rectal exam: PRESENT: deferred


Extremities exam: PRESENT: full ROM.  ABSENT: calf tenderness, clubbing, pedal 

edema


Neurological exam: PRESENT: altered





Results


Laboratory Results: 


                                        





                                 09/26/20 08:00 





                                 09/26/20 08:00 





                                        











  09/26/20 09/26/20





  08:00 08:00


 


WBC   20.5 H


 


RBC   3.09 L


 


Hgb   9.3 L


 


Hct   27.4 L


 


MCV   89


 


MCH   30.1


 


MCHC   33.9


 


RDW   16.5 H


 


Plt Count   366


 


Seg Neutrophils %   Not Reportable


 


Sodium  123.4 L 


 


Potassium  2.7 L* 


 


Chloride  96 L 


 


Carbon Dioxide  20 L 


 


Anion Gap  7 


 


BUN  6 L 


 


Creatinine  0.40 L 


 


Est GFR (African Amer)  > 60 


 


Glucose  121 H 


 


Calcium  6.2 L* 


 


Magnesium  1.4 L 


 


Total Bilirubin  0.4 


 


AST  30 


 


Alkaline Phosphatase  159 H 


 


Total Protein  4.3 L 


 


Albumin  2.2 L 











Impressions: 


                                        





Abdomen/Pelvis CT  09/23/20 00:00


IMPRESSION:  Increasing left pleural effusion.  Complete collapse of left lower 

lobe as described.


 


IMPRESSION:  No acute findings in the abdomen or pelvis.  The study is degraded 

by respiratory motion.


 








Chest CT  09/23/20 00:00


IMPRESSION:  Increasing left pleural effusion.  Complete collapse of left lower 

lobe as described.


 


IMPRESSION:  No acute findings in the abdomen or pelvis.  The study is degraded 

by respiratory motion.


 








Head CT  09/23/20 00:00


IMPRESSION:  1.  Small metastatic foci in the left cerebral hemisphere are again

noted but less apparent on the current study.  This in part is due to lack of IV

contrast.


2.  No intracranial hemorrhage.  No midline shift.


EVIDENCE OF ACUTE STROKE: NO.


 














Assessment and Plan





- Diagnosis


(1) Seizure disorder


Is this a current diagnosis for this admission?: Yes   


Plan: 


9/23/2020-patient having the active continuous seizure activity received IV 

Dilantin, IV Keppra, also on IV lorazepam as needed.  Plan is to start Vimpat 

from evening.  EEG was requested.  Aspiration, seizure precautions will be 

requested.








9/24/2020-to give Dilantin 300 mg IV daily, Keppra thousand milligrams twice 

daily.  EEG yesterday indicates status epilepticus.  Improvement in seizure 

activity is noticed compared to yesterday.  Patient is more alert more awake t

his morning.








9/25/20-plan is to increase the Dilantin to 400 mg IV daily.  On Keppra 1000 mg 

twice daily.  Repeat EKG was done today.  Continuous seizure activity is noted. 

Patient is n.p.o. at this time.








9/26/2020-repeat EEG was done yesterday.  Shows continued seizure activity.  

Patient is on Dilantin 500 mg IV daily, Keppra 1000 milligrams IV twice a day, 

she was started on Vimpat last night.








(2) Chronic hyponatremia


Is this a current diagnosis for this admission?: Yes   


Plan: 


Chronic hyponatremia.  Likely due to underlying malignancy.


Sodium seems to be at baseline.  Continue normal saline.  Monitor sodium level. 

Monitor for seizure.








9/23/2020-serum sodium is 128.9.  Chronic hyponatremia most likely secondary to 

underlying malignancy.








9/24/2020-serum sodium is 128.2.  Hyponatremia is resolved.








9/25/20-serum sodium is 127.8.  Chronic hyponatremia most likely secondary to 

poor oral intake and underlying malignancy.





9/26/2020-serum sodium is around 123.  To change the IV fluids normal saline at 

75 cc/h.  Chronic hyponatremia secondary to underlying malignancy.








(3) Hypertension


Is this a current diagnosis for this admission?: Yes   


Plan: 


Seems dehydrated.  Mildly hypertensive.


Continue fluid resuscitation guided by volume status.  Resume home meds.  PRN 

hydralazine.  Adjust meds as needed.








9/23/2020-patient has history of chronic essential hypertension systolic blood 

pressure close to 200.  Started on IV hydralazine 10 mg every 4 PRN for systolic

blood pressure more than 170.  It may be a contributing factor for seizure.





9/24/2020-blood pressure this morning is 133/57.  Stable.





9/25/2020-latest blood pressure is 148/60.  Stable.








9/26/2020-blood pressure today is 147/86.  Stable.








(4) Hypocalcemia


Is this a current diagnosis for this admission?: Yes   


Plan: 


9/23/2020-corrected serum calcium is within normal limits.








9/24/2020-serum calcium 6.7, serum albumin is 2.1, PTH is elevated.  To give her

2 g of IV calcium this morning.








9/25/2020-serum calcium is 6.9.  Albumin is 2.2.  Corrected calcium is within 

normal limits.





9/26/2020-serum calcium is around 6.1.  To give 2 g of IV calcium gluconate.








(5) Hypomagnesemia


Is this a current diagnosis for this admission?: Yes   


Plan: 


9/23/2020-serum magnesium is 1.7 to give 1 g of IV magnesium at this time.








9/25/2020-serum magnesium is 1.9 today.  To closely monitor the magnesium 

levels.





9/26/2020-serum magnesium is 1.4.  To give 2 g of IV magnesium today.








(6) History of chronic lymphocytic leukemia


Is this a current diagnosis for this admission?: Yes   


Plan: 


History of CLL.  Followed by Dr. Snyder as outpatient.  Presenting with 

leukocytosis but no sign of acute infection.  Vitals WNL.


Patient also started on dexamethasone 4 mg p.o. daily as outpatient which may 

also explain her worsening leukocytosis.





Blood culture.  No antibiotic indicated at this point, will start on empiric IV 

antibiotics guided by clinical symptoms and culture results.  








9/23/2020-patient has history of CLL.  Dr. Snyder on board.  WBC count is 

elevated most likely secondary to CLL.  Afebrile.  Not on antibiotics at this 

time.








9/24/20-WBC count improved to 20,000.  Patient is on cefepime at this time.








9/25/20-WBC count is more than 22,000.  Stable








9/26/2020-WBC count is 20,500.  Patient is on IV cefepime.  Plan is to continue 

antibiotic therapy at this time.








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


Qualifiers: 


   Laterality: unspecified laterality   Qualified Code(s): C34.90 - Malignant 

neoplasm of unspecified part of unspecified bronchus or lung   


Is this a current diagnosis for this admission?: Yes   





(8) Protein-energy malnutrition


Qualifiers: 


   Protein-calorie malnutrition severity: severe   Qualified Code(s): E43 - 

Unspecified severe protein-calorie malnutrition   


Is this a current diagnosis for this admission?: Yes   


Plan: 


9/23/2020-patient BMI is less than 17.  On examination wasting of the temporalis

muscles, quadriceps muscles, deltoid muscle seen.  Patient is n.p.o. at this 

time.  Dietary consult is requested.








9/26/2020-serum albumin is low.  To give albumin supplementations at this time.








- Time


Anticipated Discharge Disposition: Home with Hospice


Anticipated Discharge Timeframe: within 48 hours

## 2020-09-27 LAB
ABSOLUTE LYMPHOCYTES# (MANUAL): 4.5 10^3/UL (ref 0.5–4.7)
ABSOLUTE MONOCYTES # (MANUAL): 0.7 10^3/UL (ref 0.1–1.4)
ADD MANUAL DIFF: YES
ALBUMIN SERPL-MCNC: 3 G/DL (ref 3.5–5)
ALP SERPL-CCNC: 138 U/L (ref 38–126)
ANION GAP SERPL CALC-SCNC: 9 MMOL/L (ref 5–19)
ANISOCYTOSIS BLD QL SMEAR: (no result)
AST SERPL-CCNC: 30 U/L (ref 14–36)
BASOPHILS NFR BLD MANUAL: 0 % (ref 0–2)
BILIRUB DIRECT SERPL-MCNC: 0.5 MG/DL (ref 0–0.4)
BILIRUB SERPL-MCNC: 0.7 MG/DL (ref 0.2–1.3)
BUN SERPL-MCNC: 6 MG/DL (ref 7–20)
BURR CELLS BLD QL SMEAR: SLIGHT
CALCIUM: 7.4 MG/DL (ref 8.4–10.2)
CHLORIDE SERPL-SCNC: 95 MMOL/L (ref 98–107)
CO2 SERPL-SCNC: 21 MMOL/L (ref 22–30)
EOSINOPHIL NFR BLD MANUAL: 0 % (ref 0–6)
ERYTHROCYTE [DISTWIDTH] IN BLOOD BY AUTOMATED COUNT: 16.4 % (ref 11.5–14)
GLUCOSE SERPL-MCNC: 104 MG/DL (ref 75–110)
HCT VFR BLD CALC: 25.2 % (ref 36–47)
HGB BLD-MCNC: 8.5 G/DL (ref 12–15.5)
MCH RBC QN AUTO: 29.9 PG (ref 27–33.4)
MCHC RBC AUTO-ENTMCNC: 33.6 G/DL (ref 32–36)
MCV RBC AUTO: 89 FL (ref 80–97)
MONOCYTES % (MANUAL): 3 % (ref 3–13)
PLATELET # BLD: 336 10^3/UL (ref 150–450)
PLATELET COMMENT: ADEQUATE
POLYCHROMASIA BLD QL SMEAR: SLIGHT
POTASSIUM SERPL-SCNC: 3.3 MMOL/L (ref 3.6–5)
PROT SERPL-MCNC: 5 G/DL (ref 6.3–8.2)
RBC # BLD AUTO: 2.83 10^6/UL (ref 3.72–5.28)
SEGMENTED NEUTROPHILS % (MAN): 77 % (ref 42–78)
TOTAL CELLS COUNTED BLD: 100
VARIANT LYMPHS NFR BLD MANUAL: 20 % (ref 13–45)
WBC # BLD AUTO: 22.3 10^3/UL (ref 4–10.5)
WBC TOXIC VACUOLES BLD QL SMEAR: PRESENT

## 2020-09-27 RX ADMIN — CYANOCOBALAMIN SCH: 1000 INJECTION, SOLUTION INTRAMUSCULAR; SUBCUTANEOUS at 10:11

## 2020-09-27 RX ADMIN — PANTOPRAZOLE SODIUM SCH MG: 40 INJECTION, POWDER, FOR SOLUTION INTRAVENOUS at 10:10

## 2020-09-27 RX ADMIN — FOLIC ACID SCH: 1 TABLET ORAL at 10:11

## 2020-09-27 RX ADMIN — CALCITRIOL SCH: 0.25 CAPSULE, LIQUID FILLED ORAL at 10:12

## 2020-09-27 RX ADMIN — POTASSIUM CHLORIDE SCH MLS/HR: 29.8 INJECTION, SOLUTION INTRAVENOUS at 12:01

## 2020-09-27 RX ADMIN — LACOSAMIDE SCH MG: 10 INJECTION INTRAVENOUS at 10:09

## 2020-09-27 RX ADMIN — DEXAMETHASONE SODIUM PHOSPHATE SCH MG: 4 INJECTION, SOLUTION INTRAMUSCULAR; INTRAVENOUS at 10:10

## 2020-09-27 RX ADMIN — LEVETIRACETAM SCH MLS/HR: 10 INJECTION INTRAVENOUS at 00:15

## 2020-09-27 RX ADMIN — CYANOCOBALAMIN SCH MCG: 1000 INJECTION, SOLUTION INTRAMUSCULAR; SUBCUTANEOUS at 10:11

## 2020-09-27 RX ADMIN — PANTOPRAZOLE SODIUM SCH MG: 40 INJECTION, POWDER, FOR SOLUTION INTRAVENOUS at 22:02

## 2020-09-27 RX ADMIN — LEVETIRACETAM SCH MLS/HR: 10 INJECTION INTRAVENOUS at 10:09

## 2020-09-27 RX ADMIN — LACOSAMIDE SCH MG: 10 INJECTION INTRAVENOUS at 18:48

## 2020-09-27 RX ADMIN — POTASSIUM CHLORIDE SCH MLS/HR: 29.8 INJECTION, SOLUTION INTRAVENOUS at 08:42

## 2020-09-27 RX ADMIN — CEFEPIME HYDROCHLORIDE SCH: 1 INJECTION, SOLUTION INTRAVENOUS at 18:46

## 2020-09-27 RX ADMIN — LEVETIRACETAM SCH MLS/HR: 10 INJECTION INTRAVENOUS at 22:02

## 2020-09-27 RX ADMIN — PHENYTOIN SODIUM SCH: 50 INJECTION INTRAMUSCULAR; INTRAVENOUS at 15:52

## 2020-09-27 RX ADMIN — CEFEPIME HYDROCHLORIDE SCH MLS/HR: 1 INJECTION, SOLUTION INTRAVENOUS at 05:02

## 2020-09-27 NOTE — PDOC PROGRESS REPORT
Subjective


Progress Note for:: 09/27/20


Subjective:: 





74 year old female past medical history of hypertension, hypothyroidism, chronic

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 

brain status post chemoradiation, recently restarted on chemotherapy, currently 

on fifth cycle, last chemotherapy 2 weeks ago, who was sent to ED by her 

oncologist Dr. Snyder for evaluation of generalized weakness, right upper and 

lower extremity weakness, altered mental status, and electrolyte abnormalities.





Source of history is Dr. Seay her son who is present in the room, as per son 

patient was at her baseline ambulating with the help of her  and p.o. 

tolerant, but recently patient has been noted to being more altered,having very 

low appetite with generalized weakness and right-sided upper and lower extremity

weakness and involuntary contractions.  Patient has not had any exposure to COVI

D, is not complaining of any nausea, vomiting, diarrhea, constipation, fever, 

chills, chest pain, shortness of breath.





Initially it was thought that her right upper and lower extremity weakness and a

ltered mental status could be related to CVA or worsening metastatic brain 

lesion and was started on dexamethasone but on 9/21/2020 MRI/MRA head was 

negative for any acute stroke or any new lesions.  Patient also had a PET scan 

on 7/28/2020 comparison on 5/5/2020 which showed favorable response to therapy, 

increased metabolic activity confined to the primary lung mass.





Patient had outpatient CBC CMP which showed chronic hyponatremia, hypomagnesemia

and hypocalcemia, electrolytes were repleted and patient was sent home.  As 

patient did not improve much she was brought to ED today.





In ED she was noted to have afebrile, normotensive, and SPO2 of 100% on room 

air.  WBC of 39,000 up from 54863 on 2/28/2019. Sodium 126.0 with baseline of 

124-132 and elevated LFTs, otherwise lactic acid, TSH, calcium and magnesium all

within normal limits.





Patient does not have any urinary retention but when in ED a [facility] patient was 

inserted she produced about 1700 cc of urine.  Urinalysis was negative for any 

infection.





On my encounter patient is comfortably resting in bed, in no apparent distress, 

very pleasant and cooperative with physical examination, she has visible right 

upper and lower extremity involuntary contraction,unfortunately does not 

communicate much due to language barrier, as per Dr. Seay her son patient has 

improved since being admitted to ED.





Dr. Seay her son who was present in the room also mentions that her mother CODE

STATUS is DNR/DNI.





I was able to talk to Dr. Snyder her oncologist who think patient will only 

need rehydration and correction of her electrolyte abnormalities and will not 

need any antibiotics and there is no sign infection even though she has elevated

leukocytosis which explained by her chronic CLL p.o. dexamethasone which was 

started recently. No further imaging recommended.








9/23/20-CT head is negative.  Recent MRI/MRA negative for stroke.  On 

examination patient is having the seizure-like activity affecting the right side

of the body.  Not responding to verbal commands.  Right gaze is fixed.  Patient 

received Keppra IV push, Dilantin IV push, lorazepam as needed to start her on 

Vimpat from this evening.  EEG was requested.  Patient will be n.p.o.  Started 

on IV hydralazine 10 mg every 4 hours as needed for systolic blood pressure more

than 170.  Keep her n.p.o. started on IV Protonix 40 mg twice a day.  

aspiration, seizure precautions will be requested.





9/24/20-patient more alert more awake this morning compared to yesterday.  

Continues to have seizure activity.  Plan is to start her on IV Dilantin 3 mg 

daily, continue Keppra 1 g IV twice a day.  PRN lorazepam IV on board.  To try 

nectar thickened liquids today.  Serum calcium is 6.7, albumin is 2.1.  To give 

2 g of IV calcium today.








9/25/2020-patient is still having the continuous seizures.  Unable to take 

anything by mouth.  Blood sugars dropped to 54 with decrease IV fluids to D5 

normal saline at this time.  To give a vitamin B12 injections.  Dilantin was 

increased to 400 mg IV daily.  Repeat EEG was done.  Latest blood pressure is 

142/60.  Stable.








9/26/20-patient is continued to have seizure activity affecting the right side 

of the body.  Patient is n.p.o.  Serum magnesium is low, serum potassium is low 

to supplement potassium and magnesium.  Serum calcium is also low.  To give IV 

calcium this morning.  Sodium is dropping to 123 to change IV fluids from D5 

normal saline to normal saline at 75 cc/h.








9/27/2020-patient is receiving Dilantin 40 mg IV daily, Keppra thousand 

milligrams IV twice a day, Vimpat 50 mg IV every 12 hours.  No seizure activity 

noticed.  Patient has tachypnea and in the mild distress not communicative.


Reason For Visit: 


WEAKNESS,DEHYDRATION,PERSISTENT HEMIPARESIS








Physical Exam


Vital Signs: 


                                        











Temp Pulse Resp BP Pulse Ox


 


 97.3 F   101 H  17   163/76 H  84 L


 


 09/26/20 19:49  09/27/20 02:00  09/26/20 19:49  09/26/20 19:49  09/26/20 19:49








                                 Intake & Output











 09/26/20 09/27/20 09/28/20





 06:59 06:59 06:59


 


Intake Total 1300 1591 


 


Output Total 1650 1500 


 


Balance -350 91 


 


Weight 38.6 kg 38.6 kg 











General appearance: PRESENT: no acute distress, disheveled, thin


Head exam: PRESENT: atraumatic


Eye exam: PRESENT: conjunctiva pale, PERRLA


Mouth exam: PRESENT: moist, tongue midline


Teeth exam: PRESENT: poor dentation


Respiratory exam: PRESENT: accessory muscle use, tachypnea


Cardiovascular exam: PRESENT: tachycardia


GI/Abdominal exam: PRESENT: normal bowel sounds, soft.  ABSENT: distended, 

guarding, mass, organolmegaly, rebound, tenderness


Rectal exam: PRESENT: deferred


Gentrourinary exam: PRESENT: indwelling catheter


Neurological exam: PRESENT: altered





Results


Laboratory Results: 


                                        





                                 09/27/20 05:00 





                                 09/27/20 05:00 





                                        











  09/27/20 09/27/20





  05:00 05:00


 


WBC  22.3 H 


 


RBC  2.83 L 


 


Hgb  8.5 L 


 


Hct  25.2 L 


 


MCV  89 


 


MCH  29.9 


 


MCHC  33.6 


 


RDW  16.4 H 


 


Plt Count  336 


 


Seg Neutrophils %  Not Reportable 


 


Sodium   124.8 L


 


Potassium   3.3 L


 


Chloride   95 L


 


Carbon Dioxide   21 L


 


Anion Gap   9


 


BUN   6 L


 


Creatinine   0.47 L


 


Est GFR (African Amer)   > 60


 


Glucose   104


 


Calcium   7.4 L


 


Magnesium   1.9


 


Total Bilirubin   0.7


 


AST   30


 


Alkaline Phosphatase   138 H


 


Total Protein   5.0 L


 


Albumin   3.0 L











Impressions: 


                                        





Abdomen/Pelvis CT  09/23/20 00:00


IMPRESSION:  Increasing left pleural effusion.  Complete collapse of left lower 

lobe as described.


 


IMPRESSION:  No acute findings in the abdomen or pelvis.  The study is degraded 

by respiratory motion.


 








Chest CT  09/23/20 00:00


IMPRESSION:  Increasing left pleural effusion.  Complete collapse of left lower 

lobe as described.


 


IMPRESSION:  No acute findings in the abdomen or pelvis.  The study is degraded 

by respiratory motion.


 








Head CT  09/23/20 00:00


IMPRESSION:  1.  Small metastatic foci in the left cerebral hemisphere are again

noted but less apparent on the current study.  This in part is due to lack of IV

contrast.


2.  No intracranial hemorrhage.  No midline shift.


EVIDENCE OF ACUTE STROKE: NO.


 














Assessment and Plan





- Diagnosis


(1) Seizure disorder


Is this a current diagnosis for this admission?: Yes   


Plan: 


9/23/2020-patient having the active continuous seizure activity received IV 

Dilantin, IV Keppra, also on IV lorazepam as needed.  Plan is to start Vimpat 

from evening.  EEG was requested.  Aspiration, seizure precautions will be 

requested.








9/24/2020-to give Dilantin 300 mg IV daily, Keppra thousand milligrams twice 

daily.  EEG yesterday indicates status epilepticus.  Improvement in seizure 

activity is noticed compared to yesterday.  Patient is more alert more awake 

this morning.








9/25/20-plan is to increase the Dilantin to 400 mg IV daily.  On Keppra 1000 mg 

twice daily.  Repeat EKG was done today.  Continuous seizure activity is noted. 

Patient is n.p.o. at this time.








9/26/2020-repeat EEG was done yesterday.  Shows continued seizure activity.  Cha soria is on Dilantin 500 mg IV daily, Keppra 1000 milligrams IV twice a day, she 

was started on Vimpat last night.





9/27/2020-Dilantin level is pending.  Presently on IV Dilantin, IV Keppra, IV 

Vimpat.  Plan is to continue the present management.








(2) Chronic hyponatremia


Is this a current diagnosis for this admission?: Yes   


Plan: 


Chronic hyponatremia.  Likely due to underlying malignancy.


Sodium seems to be at baseline.  Continue normal saline.  Monitor sodium level. 

Monitor for seizure.








9/23/2020-serum sodium is 128.9.  Chronic hyponatremia most likely secondary to 

underlying malignancy.








9/24/2020-serum sodium is 128.2.  Hyponatremia is resolved.








9/25/20-serum sodium is 127.8.  Chronic hyponatremia most likely secondary to 

poor oral intake and underlying malignancy.





9/26/2020-serum sodium is around 123.  To change the IV fluids normal saline at 

75 cc/h.  Chronic hyponatremia secondary to underlying malignancy.





9/27/2020-serum sodium is 124.8.  Stable.  Normal saline at 75 cc/h plan is to 

decrease it to 50 cc/h because of third spacing.








(3) Hypertension


Is this a current diagnosis for this admission?: Yes   


Plan: 


Seems dehydrated.  Mildly hypertensive.


Continue fluid resuscitation guided by volume status.  Resume home meds.  PRN 

hydralazine.  Adjust meds as needed.








9/23/2020-patient has history of chronic essential hypertension systolic blood 

pressure close to 200.  Started on IV hydralazine 10 mg every 4 PRN for systolic

blood pressure more than 170.  It may be a contributing factor for seizure.





9/24/2020-blood pressure this morning is 133/57.  Stable.





9/25/2020-latest blood pressure is 148/60.  Stable.








9/26/2020-blood pressure today is 147/86.  Stable.





9/27/2020-blood pressure is 163/76.  Plan is to continue the present management 

at this time.








(4) Hypocalcemia


Is this a current diagnosis for this admission?: Yes   


Plan: 


9/23/2020-corrected serum calcium is within normal limits.








9/24/2020-serum calcium 6.7, serum albumin is 2.1, PTH is elevated.  To give her

2 g of IV calcium this morning.








9/25/2020-serum calcium is 6.9.  Albumin is 2.2.  Corrected calcium is within 

normal limits.





9/26/2020-serum calcium is around 6.1.  To give 2 g of IV calcium gluconate.








9/27/2020-serum calcium is 7.4 with albumin of 3.0.  Plan is to repeat the labs 

tomorrow.








(5) Hypomagnesemia


Is this a current diagnosis for this admission?: Yes   


Plan: 


9/23/2020-serum magnesium is 1.7 to give 1 g of IV magnesium at this time.








9/25/2020-serum magnesium is 1.9 today.  To closely monitor the magnesium 

levels.





9/26/2020-serum magnesium is 1.4.  To give 2 g of IV magnesium today.








(6) History of chronic lymphocytic leukemia


Is this a current diagnosis for this admission?: Yes   


Plan: 


History of CLL.  Followed by Dr. Snyder as outpatient.  Presenting with 

leukocytosis but no sign of acute infection.  Vitals WNL.


Patient also started on dexamethasone 4 mg p.o. daily as outpatient which may 

also explain her worsening leukocytosis.





Blood culture.  No antibiotic indicated at this point, will start on empiric IV 

antibiotics guided by clinical symptoms and culture results.  








9/23/2020-patient has history of CLL.  Dr. Snyder on board.  WBC count is 

elevated most likely secondary to CLL.  Afebrile.  Not on antibiotics at this 

time.








9/24/20-WBC count improved to 20,000.  Patient is on cefepime at this time.








9/25/20-WBC count is more than 22,000.  Stable








9/26/2020-WBC count is 20,500.  Patient is on IV cefepime.  Plan is to continue 

antibiotic therapy at this time.








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


Qualifiers: 


   Laterality: unspecified laterality   Qualified Code(s): C34.90 - Malignant 

neoplasm of unspecified part of unspecified bronchus or lung   


Is this a current diagnosis for this admission?: Yes   


Plan: 


Recurrent.  Status post chemoradiation.  Currently on chemotherapy for cycle.  

Last chemotherapy 2 weeks ago. Followed by Dr. Snyder as outpatient.





Oncology consulted.  Follow-up recommendations.








(8) Protein-energy malnutrition


Qualifiers: 


   Protein-calorie malnutrition severity: severe   Qualified Code(s): E43 - 

Unspecified severe protein-calorie malnutrition   


Is this a current diagnosis for this admission?: Yes   


Plan: 


9/23/2020-patient BMI is less than 17.  On examination wasting of the temporalis

muscles, quadriceps muscles, deltoid muscle seen.  Patient is n.p.o. at this 

time.  Dietary consult is requested.








9/26/2020-serum albumin is low.  To give albumin supplementations at this time.








- Time


Anticipated Discharge Disposition: Home with Hospice


Anticipated Discharge Timeframe: within 24 hours

## 2020-09-28 VITALS — DIASTOLIC BLOOD PRESSURE: 81 MMHG | SYSTOLIC BLOOD PRESSURE: 177 MMHG

## 2020-09-28 RX ADMIN — CEFEPIME HYDROCHLORIDE SCH: 1 INJECTION, SOLUTION INTRAVENOUS at 06:07

## 2020-09-28 NOTE — PDOC DISCHARGE SUMMARY
Impression





- Admit/DC Date/PCP


Admission Date/Primary Care Provider: 


  09/23/20 14:40





  KUNAL BYRD MD





Discharge Date: 09/28/20





- Additional Information


Resuscitation Status: Do Not Resuscitate


Discharge Diet: As Tolerated, Other (Comments)


Discharge Activity: Other


Referrals: 


[facility] [Outside]


KUNAL BYRD MD [Primary Care Provider] - Follow up as needed





History of Present Illiness


History of Present Illness: 


Per Admitting Physician:


"


74 year old female past medical history of hypertension, hypothyroidism, chronic

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 

brain status post chemoradiation, recently restarted on chemotherapy, currently 

on fifth cycle, last chemotherapy 2 weeks ago, who was sent to ED by her 

oncologist Dr. Snyder for evaluation of generalized weakness, right upper and 

lower extremity weakness, altered mental status, and electrolyte abnormalities.





Source of history is Dr. Byrd her son who is present in the room, as per son 

patient was at her baseline ambulating with the help of her  and p.o. 

tolerant, but recently patient has been noted to being more altered,having very 

low appetite with generalized weakness and right-sided upper and lower extremity

weakness and involuntary contractions.  Patient has not had any exposure to 

COVID, is not complaining of any nausea, vomiting, diarrhea, constipation, 

fever, chills, chest pain, shortness of breath.





Initially it was thought that her right upper and lower extremity weakness and 

altered mental status could be related to CVA or worsening metastatic brain 

lesion and was started on dexamethasone but on 9/21/2020 MRI/MRA head was 

negative for any acute stroke or any new lesions.  Patient also had a PET scan 

on 7/28/2020 comparison on 5/5/2020 which showed favorable response to therapy, 

increased metabolic activity confined to the primary lung mass.





Patient had outpatient CBC CMP which showed chronic hyponatremia, hypomagnesemia

and hypocalcemia, electrolytes were repleted and patient was sent home.  As 

patient did not improve much she was brought to ED today.





In ED she was noted to have afebrile, normotensive, and SPO2 of 100% on room 

air.  WBC of 39,000 up from 23861 on 2/28/2019. Sodium 126.0 with baseline of 

124-132 and elevated LFTs, otherwise lactic acid, TSH, calcium and magnesium all

within normal limits.





Patient does not have any urinary retention but when in ED a [facility] patient was 

inserted she produced about 1700 cc of urine.  Urinalysis was negative for any 

infection.





On my encounter patient is comfortably resting in bed, in no apparent distress, 

very pleasant and cooperative with physical examination, she has visible right 

upper and lower extremity involuntary contraction,unfortunately does not 

communicate much due to language barrier, as per Dr. Byrd her son patient has 

improved since being admitted to ED.





Dr. Byrd her son who was present in the room also mentions that her mother CODE

STATUS is DNR/DNI.





I was able to talk to Dr. Snyder her oncologist who think patient will only 

need rehydration and correction of her electrolyte abnormalities and will not 

need any antibiotics and there is no sign infection even though she has elevated

leukocytosis which explained by her chronic CLL p.o. dexamethasone which was 

started recently. No further imaging recommended."











Hospital Course


Hospital Course: 


Per Admitting Physician:


"


74 year old female past medical history of hypertension, hypothyroidism, chronic

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 

brain status post chemoradiation, recently restarted on chemotherapy, currently 

on fifth cycle, last chemotherapy 2 weeks ago, who was sent to ED by her 

oncologist Dr. Snyder for evaluation of generalized weakness, right upper and 

lower extremity weakness, altered mental status, and electrolyte abnormalities.





Source of history is Dr. Byrd her son who is present in the room, as per son 

patient was at her baseline ambulating with the help of her  and p.o. 

tolerant, but recently patient has been noted to being more altered,having very 

low appetite with generalized weakness and right-sided upper and lower extremity

weakness and involuntary contractions.  Patient has not had any exposure to 

COVID, is not complaining of any nausea, vomiting, diarrhea, constipation, 

fever, chills, chest pain, shortness of breath.





Initially it was thought that her right upper and lower extremity weakness and 

altered mental status could be related to CVA or worsening metastatic brain 

lesion and was started on dexamethasone but on 9/21/2020 MRI/MRA head was 

negative for any acute stroke or any new lesions.  Patient also had a PET scan 

on 7/28/2020 comparison on 5/5/2020 which showed favorable response to therapy, 

increased metabolic activity confined to the primary lung mass.





Patient had outpatient CBC CMP which showed chronic hyponatremia, hypomagnesemia

and hypocalcemia, electrolytes were repleted and patient was sent home.  As 

patient did not improve much she was brought to ED today.





In ED she was noted to have afebrile, normotensive, and SPO2 of 100% on room 

air.  WBC of 39,000 up from 03965 on 2/28/2019. Sodium 126.0 with baseline of 

124-132 and elevated LFTs, otherwise lactic acid, TSH, calcium and magnesium all

within normal limits.





Patient does not have any urinary retention but when in ED a [facility] patient was 

inserted she produced about 1700 cc of urine.  Urinalysis was negative for any 

infection.





On my encounter patient is comfortably resting in bed, in no apparent distress, 

very pleasant and cooperative with physical examination, she has visible right u

pper and lower extremity involuntary contraction,unfortunately does not 

communicate much due to language barrier, as per Dr. Byrd her son patient has 

improved since being admitted to ED.





Dr. Byrd her son who was present in the room also mentions that her mother CODE

STATUS is DNR/DNI.





I was able to talk to Dr. Snyder her oncologist who think patient will only 

need rehydration and correction of her electrolyte abnormalities and will not 

need any antibiotics and there is no sign infection even though she has elevated

leukocytosis which explained by her chronic CLL p.o. dexamethasone which was 

started recently. No further imaging recommended.








9/23/20-CT head is negative.  Recent MRI/MRA negative for stroke.  On 

examination patient is having the seizure-like activity affecting the right side

of the body.  Not responding to verbal commands.  Right gaze is fixed.  Patient 

received Keppra IV push, Dilantin IV push, lorazepam as needed to start her on 

Vimpat from this evening.  EEG was requested.  Patient will be n.p.o.  Started 

on IV hydralazine 10 mg every 4 hours as needed for systolic blood pressure more

than 170.  Keep her n.p.o. started on IV Protonix 40 mg twice a day.  aspir

ation, seizure precautions will be requested.





9/24/20-patient more alert more awake this morning compared to yesterday.  

Continues to have seizure activity.  Plan is to start her on IV Dilantin 3 mg 

daily, continue Keppra 1 g IV twice a day.  PRN lorazepam IV on board.  To try 

nectar thickened liquids today.  Serum calcium is 6.7, albumin is 2.1.  To give 

2 g of IV calcium today.








9/25/2020-patient is still having the continuous seizures.  Unable to take 

anything by mouth.  Blood sugars dropped to 54 with decrease IV fluids to D5 

normal saline at this time.  To give a vitamin B12 injections.  Dilantin was 

increased to 400 mg IV daily.  Repeat EEG was done.  Latest blood pressure is 

142/60.  Stable.








9/26/20-patient is continued to have seizure activity affecting the right side 

of the body.  Patient is n.p.o.  Serum magnesium is low, serum potassium is low 

to supplement potassium and magnesium.  Serum calcium is also low.  To give IV 

calcium this morning.  Sodium is dropping to 123 to change IV fluids from D5 

normal saline to normal saline at 75 cc/h.








9/27/2020-patient is receiving Dilantin 40 mg IV daily, Keppra thousand 

milligrams IV twice a day, Vimpat 50 mg IV every 12 hours.  No seizure activity 

noticed.  Patient has tachypnea and in the mild distress not communicative."























Discharged home with hospice




















Per Admitting Physician:


"(1) Seizure disorder


Is this a current diagnosis for this admission?: Yes   


Plan: 


9/23/2020-patient having the active continuous seizure activity received IV 

Dilantin, IV Keppra, also on IV lorazepam as needed.  Plan is to start Vimpat 

from evening.  EEG was requested.  Aspiration, seizure precautions will be 

requested.








9/24/2020-to give Dilantin 300 mg IV daily, Keppra thousand milligrams twice 

daily.  EEG yesterday indicates status epilepticus.  Improvement in seizure 

activity is noticed compared to yesterday.  Patient is more alert more awake 

this morning.








9/25/20-plan is to increase the Dilantin to 400 mg IV daily.  On Keppra 1000 mg 

twice daily.  Repeat EKG was done today.  Continuous seizure activity is noted. 

Patient is n.p.o. at this time.








9/26/2020-repeat EEG was done yesterday.  Shows continued seizure activity.  

Patient is on Dilantin 500 mg IV daily, Keppra 1000 milligrams IV twice a day, 

she was started on Vimpat last night.





9/27/2020-Dilantin level is pending.  Presently on IV Dilantin, IV Keppra, IV 

Vimpat.  Plan is to continue the present management.








(2) Chronic hyponatremia


Is this a current diagnosis for this admission?: Yes   


Plan: 


Chronic hyponatremia.  Likely due to underlying malignancy.


Sodium seems to be at baseline.  Continue normal saline.  Monitor sodium level. 

Monitor for seizure.








9/23/2020-serum sodium is 128.9.  Chronic hyponatremia most likely secondary to 

underlying malignancy.








9/24/2020-serum sodium is 128.2.  Hyponatremia is resolved.








9/25/20-serum sodium is 127.8.  Chronic hyponatremia most likely secondary to 

poor oral intake and underlying malignancy.





9/26/2020-serum sodium is around 123.  To change the IV fluids normal saline at 

75 cc/h.  Chronic hyponatremia secondary to underlying malignancy.





9/27/2020-serum sodium is 124.8.  Stable.  Normal saline at 75 cc/h plan is to 

decrease it to 50 cc/h because of third spacing.








(3) Hypertension


Is this a current diagnosis for this admission?: Yes   


Plan: 


Seems dehydrated.  Mildly hypertensive.


Continue fluid resuscitation guided by volume status.  Resume home meds.  PRN 

hydralazine.  Adjust meds as needed.








9/23/2020-patient has history of chronic essential hypertension systolic blood 

pressure close to 200.  Started on IV hydralazine 10 mg every 4 PRN for systolic

blood pressure more than 170.  It may be a contributing factor for seizure.





9/24/2020-blood pressure this morning is 133/57.  Stable.





9/25/2020-latest blood pressure is 148/60.  Stable.








9/26/2020-blood pressure today is 147/86.  Stable.





9/27/2020-blood pressure is 163/76.  Plan is to continue the present management 

at this time.








(4) Hypocalcemia


Is this a current diagnosis for this admission?: Yes   


Plan: 


9/23/2020-corrected serum calcium is within normal limits.








9/24/2020-serum calcium 6.7, serum albumin is 2.1, PTH is elevated.  To give her

2 g of IV calcium this morning.








9/25/2020-serum calcium is 6.9.  Albumin is 2.2.  Corrected calcium is within 

normal limits.





9/26/2020-serum calcium is around 6.1.  To give 2 g of IV calcium gluconate.








9/27/2020-serum calcium is 7.4 with albumin of 3.0.  Plan is to repeat the labs 

tomorrow.








(5) Hypomagnesemia


Is this a current diagnosis for this admission?: Yes   


Plan: 


9/23/2020-serum magnesium is 1.7 to give 1 g of IV magnesium at this time.








9/25/2020-serum magnesium is 1.9 today.  To closely monitor the magnesium 

levels.





9/26/2020-serum magnesium is 1.4.  To give 2 g of IV magnesium today.








(6) History of chronic lymphocytic leukemia


Is this a current diagnosis for this admission?: Yes   


Plan: 


History of CLL.  Followed by Dr. Snyder as outpatient.  Presenting with 

leukocytosis but no sign of acute infection.  Vitals WNL.


Patient also started on dexamethasone 4 mg p.o. daily as outpatient which may 

also explain her worsening leukocytosis.





Blood culture.  No antibiotic indicated at this point, will start on empiric IV 

antibiotics guided by clinical symptoms and culture results.  








9/23/2020-patient has history of CLL.  Dr. Snyder on board.  WBC count is 

elevated most likely secondary to CLL.  Afebrile.  Not on antibiotics at this 

time.








9/24/20-WBC count improved to 20,000.  Patient is on cefepime at this time.








9/25/20-WBC count is more than 22,000.  Stable








9/26/2020-WBC count is 20,500.  Patient is on IV cefepime.  Plan is to continue 

antibiotic therapy at this time.








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


Qualifiers: 


   Laterality: unspecified laterality   Qualified Code(s): C34.90 - Malignant 

neoplasm of unspecified part of unspecified bronchus or lung   


Is this a current diagnosis for this admission?: Yes   


Plan: 


Recurrent.  Status post chemoradiation.  Currently on chemotherapy for cycle.  

Last chemotherapy 2 weeks ago. Followed by Dr. Snyder as outpatient.





Oncology consulted.  Follow-up recommendations.








(8) Protein-energy malnutrition


Qualifiers: 


   Protein-calorie malnutrition severity: severe   Qualified Code(s): E43 - 

Unspecified severe protein-calorie malnutrition   


Is this a current diagnosis for this admission?: Yes   


Plan: 


9/23/2020-patient BMI is less than 17.  On examination wasting of the temporalis

muscles, quadriceps muscles, deltoid muscle seen.  Patient is n.p.o. at this 

time.  Dietary consult is requested.








9/26/2020-serum albumin is low.  To give albumin supplementations at this time.


"





Physical Exam


Vital Signs: 


                                        











Temp Pulse Resp BP Pulse Ox


 


 97.9 F   105 H  24 H  177/81 H  88 L


 


 09/28/20 08:40  09/28/20 08:40  09/28/20 08:40  09/28/20 08:40  09/28/20 08:40








                                 Intake & Output











 09/27/20 09/28/20 09/29/20





 06:59 06:59 06:59


 


Intake Total 1691 350 


 


Output Total 1500 1950 


 


Balance 191 -1600 


 


Weight 38.6 kg 38.6 kg 











Exam: 





Patient left before she could be examined





Results


Laboratory Results: 


                                        











WBC  22.3 10^3/uL (4.0-10.5)  H  09/27/20  05:00    


 


RBC  2.83 10^6/uL (3.72-5.28)  L  09/27/20  05:00    


 


Hgb  8.5 g/dL (12.0-15.5)  L  09/27/20  05:00    


 


Hct  25.2 % (36.0-47.0)  L  09/27/20  05:00    


 


MCV  89 fl (80-97)   09/27/20  05:00    


 


MCH  29.9 pg (27.0-33.4)   09/27/20  05:00    


 


MCHC  33.6 g/dL (32.0-36.0)   09/27/20  05:00    


 


RDW  16.4 % (11.5-14.0)  H  09/27/20  05:00    


 


Plt Count  336 10^3/uL (150-450)   09/27/20  05:00    


 


Lymph % (Auto)  Not Reportable   09/27/20  05:00    


 


Mono % (Auto)  Not Reportable   09/27/20  05:00    


 


Eos % (Auto)  Not Reportable   09/27/20  05:00    


 


Baso % (Auto)  Not Reportable   09/27/20  05:00    


 


Absolute Neuts (auto)  Not Reportable   09/27/20  05:00    


 


Absolute Lymphs (auto)  Not Reportable   09/27/20  05:00    


 


Absolute Monos (auto)  Not Reportable   09/27/20  05:00    


 


Absolute Eos (auto)  Not Reportable   09/27/20  05:00    


 


Absolute Basos (auto)  Not Reportable   09/27/20  05:00    


 


Total Counted  100   09/27/20  05:00    


 


Seg Neutrophils %  Not Reportable   09/27/20  05:00    


 


Seg Neuts % (Manual)  77 % (42-78)   09/27/20  05:00    


 


Band Neutrophils %  1 % (3-5)  L  09/22/20  19:52    


 


Lymphocytes % (Manual)  20 % (13-45)   09/27/20  05:00    


 


Monocytes % (Manual)  3 % (3-13)   09/27/20  05:00    


 


Eosinophils % (Manual)  0 % (0-6)   09/27/20  05:00    


 


Basophils % (Manual)  0 % (0-2)   09/27/20  05:00    


 


Abs Neuts (Manual)  17.2 10^3/uL (1.7-8.2)  H  09/27/20  05:00    


 


Abs Lymphs (Manual)  4.5 10^3/uL (0.5-4.7)   09/27/20  05:00    


 


Abs Monocytes (Manual)  0.7 10^3/uL (0.1-1.4)   09/27/20  05:00    


 


Absolute Eos (Manual)  0.0 10^3/uL (0.0-0.6)   09/27/20  05:00    


 


Abs Basophils (Manual)  0.0 10^3/uL (0.0-0.2)   09/27/20  05:00    


 


Nucleated RBCs  2 /100 WBC (0)   09/24/20  06:40    


 


Toxic Vacuolation  PRESENT   09/27/20  05:00    


 


Large Platelets  PRESENT   09/24/20  06:40    


 


Platelet Comment  ADEQUATE   09/27/20  05:00    


 


Polychromasia  SLIGHT   09/27/20  05:00    


 


Hypochromasia  SLIGHT   09/25/20  06:05    


 


Poikilocytosis  SLIGHT   09/23/20  06:50    


 


Basophilic Stippling  PRESENT   09/24/20  06:40    


 


Anisocytosis  1+   09/27/20  05:00    


 


Ovalocytes  SLIGHT   09/26/20  08:00    


 


Deep Cells  SLIGHT   09/27/20  05:00    


 


PT  13.0 SEC (11.4-15.4)   09/22/20  19:52    


 


INR  0.96   09/22/20  19:52    


 


Sodium  124.8 mmol/L (137-145)  L  09/27/20  05:00    


 


Potassium  3.3 mmol/L (3.6-5.0)  L  09/27/20  05:00    


 


Chloride  95 mmol/L ()  L  09/27/20  05:00    


 


Carbon Dioxide  21 mmol/L (22-30)  L  09/27/20  05:00    


 


Anion Gap  9  (5-19)   09/27/20  05:00    


 


BUN  6 mg/dL (7-20)  L  09/27/20  05:00    


 


Creatinine  0.47 mg/dL (0.52-1.25)  L  09/27/20  05:00    


 


Est GFR ( Amer)  > 60  (>60)   09/27/20  05:00    


 


Est GFR (MDRD) Non-Af  > 60  (>60)   09/27/20  05:00    


 


Glucose  104 mg/dL ()   09/27/20  05:00    


 


POC Glucose  233 mg/dL ()  H  09/25/20  11:16    


 


Lactic Acid  1.6 mmol/L (0.7-2.1)   09/22/20  19:52    


 


Calcium  7.4 mg/dL (8.4-10.2)  L  09/27/20  05:00    


 


Ionized Calcium Angela  1.15 mmol/L (1.14-1.30)   09/22/20  22:06    


 


Phosphorus  3.4 mg/dL (2.5-4.5)   09/23/20  06:50    


 


Magnesium  1.9 mg/dL (1.6-2.3)   09/27/20  05:00    


 


Total Bilirubin  0.7 mg/dL (0.2-1.3)   09/27/20  05:00    


 


Direct Bilirubin  0.5 mg/dL (0.0-0.4)  H  09/27/20  05:00    


 


Neonat Total Bilirubin  Not Reportable   09/27/20  05:00    


 


Neonat Direct Bilirubin  Not Reportable   09/27/20  05:00    


 


Neonat Indirect Bili  Not Reportable   09/27/20  05:00    


 


AST  30 U/L (14-36)   09/27/20  05:00    


 


ALT  26 U/L (<35)   09/27/20  05:00    


 


Alkaline Phosphatase  138 U/L ()  H  09/27/20  05:00    


 


Ammonia  < 8.7 umol/L (9-33)  L  09/23/20  06:50    


 


Total Protein  5.0 g/dL (6.3-8.2)  L  09/27/20  05:00    


 


Albumin  3.0 g/dL (3.5-5.0)  L  09/27/20  05:00    


 


Vitamin D 25-Hydroxy  32.3 ng/mL (14.7-68.3)   09/24/20  06:40    


 


TSH  1.05 uIU/mL (0.47-4.68)   09/22/20  19:55    


 


PTH Intact  324.6 pg/mL (10.0-65.0)  H  09/24/20  06:40    


 


Urine Color  YELLOW   09/22/20  20:11    


 


Urine Appearance  CLEAR   09/22/20  20:11    


 


Urine pH  6.0  (5.0-9.0)   09/22/20  20:11    


 


Ur Specific Gravity  1.011   09/22/20  20:11    


 


Urine Protein  NEGATIVE mg/dL (NEGATIVE)   09/22/20  20:11    


 


Urine Glucose (UA)  NEGATIVE mg/dL (NEGATIVE)   09/22/20  20:11    


 


Urine Ketones  NEGATIVE mg/dL (NEGATIVE)   09/22/20  20:11    


 


Urine Blood  NEGATIVE  (NEGATIVE)   09/22/20  20:11    


 


Urine Nitrite  NEGATIVE  (NEGATIVE)   09/22/20  20:11    


 


Urine Bilirubin  NEGATIVE  (NEGATIVE)   09/22/20  20:11    


 


Urine Urobilinogen  NEGATIVE mg/dL (<2.0)   09/22/20  20:11    


 


Ur Leukocyte Esterase  NEGATIVE  (NEGATIVE)   09/22/20  20:11    


 


Urine WBC (Auto)  1 /HPF  09/22/20  20:11    


 


Urine RBC (Auto)  0 /HPF  09/22/20  20:11    


 


Urine Bacteria (Auto)  TRACE /HPF  09/22/20  20:11    


 


Urine Mucus (Auto)  RARE /LPF  09/22/20  20:11    


 


Urine Creatinine  24.0 mg/dL ()   09/23/20  08:25    


 


Urine Sodium  182 mmol/L (30-90)  H  09/23/20  08:25    


 


Urine Ascorbic Acid  NEGATIVE  (NEGATIVE)   09/22/20  20:11    


 


Phenytoin  25.8 ug/mL (10.0-20.0)  H*  09/27/20  13:48    


 


Levetiracetam  91.4 ug/mL (10.0-40.0)  H  09/22/20  19:52    


 


Slides for Path Review  PATHOLOGIST REVIEWED   09/22/20  19:52    











Impressions: 


                                        





Abdomen/Pelvis CT  09/23/20 00:00


IMPRESSION:  Increasing left pleural effusion.  Complete collapse of left lower 

lobe as described.


 


IMPRESSION:  No acute findings in the abdomen or pelvis.  The study is degraded 

by respiratory motion.


 








Chest CT  09/23/20 00:00


IMPRESSION:  Increasing left pleural effusion.  Complete collapse of left lower 

lobe as described.


 


IMPRESSION:  No acute findings in the abdomen or pelvis.  The study is degraded 

by respiratory motion.


 








Head CT  09/23/20 00:00


IMPRESSION:  1.  Small metastatic foci in the left cerebral hemisphere are again

noted but less apparent on the current study.  This in part is due to lack of IV

contrast.


2.  No intracranial hemorrhage.  No midline shift.


EVIDENCE OF ACUTE STROKE: NO.


 














Plan


Plan of Treatment: 


Home with hospice


Time Spent: Less than 30 Minutes





Stroke


Is this a Stroke Patient?: No





Acute Heart Failure


Is this a Heart Failure Patient?: No

## 2020-09-28 NOTE — PDOC PROGRESS REPORT
Subjective


Progress Note for:: 09/28/20


Subjective:: 


Pt resting comfortably, hospice involved now and transport planned at 8:30am. 

Added fentanyl patch this am and giving IV VIMPAT this am as last dose





Reason For Visit: 


WEAKNESS,DEHYDRATION,PERSISTENT HEMIPARESIS








Physical Exam


Vital Signs: 


                                        











Temp Pulse Resp BP Pulse Ox


 


 97.9 F   105 H  24 H  157/128 H  88 L


 


 09/27/20 20:00  09/28/20 02:00  09/27/20 20:00  09/27/20 20:00  09/27/20 20:00








                                 Intake & Output











 09/27/20 09/28/20 09/29/20





 06:59 06:59 06:59


 


Intake Total 1691 350 


 


Output Total 1500 1950 


 


Balance 191 -1600 


 


Weight 38.6 kg 38.6 kg 











General appearance: PRESENT: no acute distress, well-developed, well-nourished


Head exam: PRESENT: atraumatic, normocephalic


Eye exam: PRESENT: conjunctiva pink, EOMI, PERRLA.  ABSENT: scleral icterus


Ear exam: PRESENT: normal external ear exam


Mouth exam: PRESENT: moist, tongue midline


Neck exam: ABSENT: carotid bruit, JVD, lymphadenopathy, thyromegaly


Respiratory exam: PRESENT: clear to auscultation raymundo.  ABSENT: rales, rhonchi, 

wheezes


Cardiovascular exam: PRESENT: RRR.  ABSENT: diastolic murmur, rubs, systolic 

murmur


Pulses: PRESENT: normal dorsalis pedis pul


Vascular exam: PRESENT: normal capillary refill


GI/Abdominal exam: PRESENT: normal bowel sounds, soft.  ABSENT: distended, 

guarding, mass, organolmegaly, rebound, tenderness


Rectal exam: PRESENT: deferred


Extremities exam: PRESENT: full ROM.  ABSENT: calf tenderness, clubbing, pedal 

edema


Neurological exam: PRESENT: alert, awake, oriented to person, oriented to place,

oriented to time, oriented to situation, CN II-XII grossly intact.  ABSENT: 

motor sensory deficit


Psychiatric exam: PRESENT: appropriate affect, normal mood.  ABSENT: homicidal 

ideation, suicidal ideation


Skin exam: PRESENT: dry, intact, warm.  ABSENT: cyanosis, rash





Results


Laboratory Results: 


                                        





                                 09/27/20 05:00 





                                 09/27/20 05:00 





                                        





09/22/20 22:06   Blood   Blood Culture - Final


                            NO GROWTH IN 5 DAYS


09/22/20 19:52   Blood   Blood Culture - Final


                            NO GROWTH IN 5 DAYS








Impressions: 


                                        





Abdomen/Pelvis CT  09/23/20 00:00


IMPRESSION:  Increasing left pleural effusion.  Complete collapse of left lower 

lobe as described.


 


IMPRESSION:  No acute findings in the abdomen or pelvis.  The study is degraded 

by respiratory motion.


 








Chest CT  09/23/20 00:00


IMPRESSION:  Increasing left pleural effusion.  Complete collapse of left lower 

lobe as described.


 


IMPRESSION:  No acute findings in the abdomen or pelvis.  The study is degraded 

by respiratory motion.


 








Head CT  09/23/20 00:00


IMPRESSION:  1.  Small metastatic foci in the left cerebral hemisphere are again

noted but less apparent on the current study.  This in part is due to lack of IV

contrast.


2.  No intracranial hemorrhage.  No midline shift.


EVIDENCE OF ACUTE STROKE: NO.


 














Assessment & Plan





- Diagnosis


(1) Generalized weakness


Is this a current diagnosis for this admission?: Yes   


Plan: 


d/c home w/ hospice, not improving








(2) Hemiparesis


Qualifiers: 


   Hemiparesis etiology: unspecified   Hemiparesis laterality: right dominant 

side   Qualified Code(s): G81.91 - Hemiplegia, unspecified affecting right 

dominant side   


Is this a current diagnosis for this admission?: Yes   


Plan: 


severe 2nd to mets brain








(3) Chronic hyponatremia


Is this a current diagnosis for this admission?: Yes   


Plan: 


persistent








(4) Lung cancer


Qualifiers: 


   Laterality: left   Lung location: lower lobe of lung   Qualified Code(s): 

C34.32 - Malignant neoplasm of lower lobe, left bronchus or lung   


Is this a current diagnosis for this admission?: Yes   


Plan: 


no further rx planned, home hospice today








(5) Status epilepticus due to complex partial seizure


Is this a current diagnosis for this admission?: Yes   


Plan: 


improved, loaded now w/. seizure meds








- Time


Time Spent with patient: 15-24 minutes

## 2020-10-02 ENCOUNTER — HOSPITAL ENCOUNTER (INPATIENT)
Dept: HOSPITAL 62 - ER | Age: 75
LOS: 2 days | DRG: 871 | End: 2020-10-04
Attending: INTERNAL MEDICINE | Admitting: INTERNAL MEDICINE
Payer: MEDICARE
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
## 2020-10-02 DIAGNOSIS — E44.0: ICD-10-CM

## 2020-10-02 DIAGNOSIS — E87.6: ICD-10-CM

## 2020-10-02 DIAGNOSIS — Z88.2: ICD-10-CM

## 2020-10-02 DIAGNOSIS — E86.1: ICD-10-CM

## 2020-10-02 DIAGNOSIS — N39.0: ICD-10-CM

## 2020-10-02 DIAGNOSIS — Z79.899: ICD-10-CM

## 2020-10-02 DIAGNOSIS — Z88.0: ICD-10-CM

## 2020-10-02 DIAGNOSIS — T44.3X5A: ICD-10-CM

## 2020-10-02 DIAGNOSIS — Z66: ICD-10-CM

## 2020-10-02 DIAGNOSIS — I10: ICD-10-CM

## 2020-10-02 DIAGNOSIS — G40.909: ICD-10-CM

## 2020-10-02 DIAGNOSIS — C91.10: ICD-10-CM

## 2020-10-02 DIAGNOSIS — A41.9: Primary | ICD-10-CM

## 2020-10-02 DIAGNOSIS — T83.511A: ICD-10-CM

## 2020-10-02 DIAGNOSIS — C34.32: ICD-10-CM

## 2020-10-02 DIAGNOSIS — J69.0: ICD-10-CM

## 2020-10-02 DIAGNOSIS — Z51.5: ICD-10-CM

## 2020-10-02 DIAGNOSIS — C79.31: ICD-10-CM

## 2020-10-02 DIAGNOSIS — E86.0: ICD-10-CM

## 2020-10-02 DIAGNOSIS — Z79.890: ICD-10-CM

## 2020-10-02 DIAGNOSIS — N17.9: ICD-10-CM

## 2020-10-02 LAB
ABSOLUTE LYMPHOCYTES# (MANUAL): 4.6 10^3/UL (ref 0.5–4.7)
ABSOLUTE MONOCYTES # (MANUAL): 1 10^3/UL (ref 0.1–1.4)
ADD MANUAL DIFF: YES
ANION GAP SERPL CALC-SCNC: 9 MMOL/L (ref 5–19)
ANISOCYTOSIS BLD QL SMEAR: (no result)
APPEARANCE UR: (no result)
APTT PPP: (no result) S
BASOPHILS NFR BLD MANUAL: 0 % (ref 0–2)
BILIRUB UR QL STRIP: (no result)
BUN SERPL-MCNC: 36 MG/DL (ref 7–20)
BURR CELLS BLD QL SMEAR: SLIGHT
CALCIUM: 7.7 MG/DL (ref 8.4–10.2)
CHLORIDE SERPL-SCNC: 105 MMOL/L (ref 98–107)
CO2 SERPL-SCNC: 24 MMOL/L (ref 22–30)
EOSINOPHIL NFR BLD MANUAL: 2 % (ref 0–6)
ERYTHROCYTE [DISTWIDTH] IN BLOOD BY AUTOMATED COUNT: 16.5 % (ref 11.5–14)
GLUCOSE SERPL-MCNC: 85 MG/DL (ref 75–110)
GLUCOSE UR STRIP-MCNC: 50 MG/DL
HCT VFR BLD CALC: 31.5 % (ref 36–47)
HGB BLD-MCNC: 10.3 G/DL (ref 12–15.5)
KETONES UR STRIP-MCNC: (no result) MG/DL
MCH RBC QN AUTO: 29.8 PG (ref 27–33.4)
MCHC RBC AUTO-ENTMCNC: 32.8 G/DL (ref 32–36)
MCV RBC AUTO: 91 FL (ref 80–97)
MONOCYTES % (MANUAL): 5 % (ref 3–13)
NEUTS BAND NFR BLD MANUAL: 4 % (ref 3–5)
NITRITE UR QL STRIP: NEGATIVE
OVALOCYTES BLD QL SMEAR: SLIGHT
PH UR STRIP: 5 [PH] (ref 5–9)
PHOSPHATE SERPL-MCNC: 1.9 MG/DL (ref 2.5–4.5)
PLATELET # BLD: 205 10^3/UL (ref 150–450)
PLATELET COMMENT: ADEQUATE
POIKILOCYTOSIS BLD QL SMEAR: SLIGHT
POLYCHROMASIA BLD QL SMEAR: SLIGHT
POTASSIUM SERPL-SCNC: 3.5 MMOL/L (ref 3.6–5)
PROT UR STRIP-MCNC: 30 MG/DL
RBC # BLD AUTO: 3.47 10^6/UL (ref 3.72–5.28)
SEGMENTED NEUTROPHILS % (MAN): 66 % (ref 42–78)
SP GR UR STRIP: 1.03
TOTAL CELLS COUNTED BLD: 100
TOXIC GRANULES BLD QL SMEAR: SLIGHT
UROBILINOGEN UR-MCNC: 4 MG/DL (ref ?–2)
VARIANT LYMPHS NFR BLD MANUAL: 23 % (ref 13–45)
WBC # BLD AUTO: 20.2 10^3/UL (ref 4–10.5)

## 2020-10-02 PROCEDURE — 85025 COMPLETE CBC W/AUTO DIFF WBC: CPT

## 2020-10-02 PROCEDURE — 96375 TX/PRO/DX INJ NEW DRUG ADDON: CPT

## 2020-10-02 PROCEDURE — 96361 HYDRATE IV INFUSION ADD-ON: CPT

## 2020-10-02 PROCEDURE — C9254 INJECTION, LACOSAMIDE: HCPCS

## 2020-10-02 PROCEDURE — 71045 X-RAY EXAM CHEST 1 VIEW: CPT

## 2020-10-02 PROCEDURE — 99285 EMERGENCY DEPT VISIT HI MDM: CPT

## 2020-10-02 PROCEDURE — 80048 BASIC METABOLIC PNL TOTAL CA: CPT

## 2020-10-02 PROCEDURE — 36415 COLL VENOUS BLD VENIPUNCTURE: CPT

## 2020-10-02 PROCEDURE — 83735 ASSAY OF MAGNESIUM: CPT

## 2020-10-02 PROCEDURE — 84100 ASSAY OF PHOSPHORUS: CPT

## 2020-10-02 PROCEDURE — C9113 INJ PANTOPRAZOLE SODIUM, VIA: HCPCS

## 2020-10-02 PROCEDURE — 96365 THER/PROPH/DIAG IV INF INIT: CPT

## 2020-10-02 PROCEDURE — 94640 AIRWAY INHALATION TREATMENT: CPT

## 2020-10-02 PROCEDURE — P9047 ALBUMIN (HUMAN), 25%, 50ML: HCPCS

## 2020-10-02 PROCEDURE — 87086 URINE CULTURE/COLONY COUNT: CPT

## 2020-10-02 PROCEDURE — G0378 HOSPITAL OBSERVATION PER HR: HCPCS

## 2020-10-02 PROCEDURE — 36591 DRAW BLOOD OFF VENOUS DEVICE: CPT

## 2020-10-02 PROCEDURE — 81001 URINALYSIS AUTO W/SCOPE: CPT

## 2020-10-02 PROCEDURE — 87040 BLOOD CULTURE FOR BACTERIA: CPT

## 2020-10-02 PROCEDURE — 83605 ASSAY OF LACTIC ACID: CPT

## 2020-10-02 PROCEDURE — 80053 COMPREHEN METABOLIC PANEL: CPT

## 2020-10-02 RX ADMIN — DIBASIC SODIUM PHOSPHATE, MONOBASIC POTASSIUM PHOSPHATE AND MONOBASIC SODIUM PHOSPHATE SCH: 852; 155; 130 TABLET ORAL at 17:47

## 2020-10-02 RX ADMIN — LACOSAMIDE SCH MG: 10 INJECTION INTRAVENOUS at 21:41

## 2020-10-02 RX ADMIN — PANTOPRAZOLE SODIUM SCH MG: 40 INJECTION, POWDER, FOR SOLUTION INTRAVENOUS at 21:41

## 2020-10-02 RX ADMIN — CEFEPIME HYDROCHLORIDE SCH MLS/HR: 1 INJECTION, SOLUTION INTRAVENOUS at 21:41

## 2020-10-02 RX ADMIN — SODIUM CHLORIDE AND POTASSIUM CHLORIDE PRN MLS/HR: 9; 2.98 INJECTION, SOLUTION INTRAVENOUS at 18:22

## 2020-10-02 RX ADMIN — DIBASIC SODIUM PHOSPHATE, MONOBASIC POTASSIUM PHOSPHATE AND MONOBASIC SODIUM PHOSPHATE SCH: 852; 155; 130 TABLET ORAL at 21:26

## 2020-10-02 NOTE — PROGRESS NOTE
Provider Note


Provider Note: 





Had long discussion with family today, pt is doing better, communicating, worked

w/ speech and is swallowing. Family decided against going back on hospice and 

would like to get back on home health instead with [facility] whom they had 

before. We will make arrangements. Pt needs home health services for med 

management, home PT, home speech rx consideration.

## 2020-10-02 NOTE — PDOC CONSULTATION
Consultation


Consult Date: 10/02/20


Attending physician:: YUKO WYNNE


Provider Consulted: AMEYA KING


Consult reason:: Patient on hospice but admitted for extreme weakness, confusion

dehydration





History of Present Illness


Admission Date/PCP: 


  10/02/20 08:29





  AMEYA KING MD





Patient complains of: Weakness, confusion


History of Present Illness: 


ELOISA BYRD is a 74 year old female with known history of stage IV lung 

cancer with brain metastasis.  She was discharged about a week ago on hospice.  

Unfortunately, overnight patient became more unresponsive and confused, 

lethargic,  became very worried with her condition, they consulted 

hospice and ultimately decision was made to send to the ER for further 

evaluation.  Upon admission she was found to have fever, probable UTI, was given

fluid and seems more responsive today.








Past Medical History


Cardiac Medical History: Reports: Hypertension


   Denies: Myocardial Infarction


Pulmonary Medical History: 


   Denies: Asthma, Tuberculosis


Neurological Medical History: 


   Denies: Seizures


Malignancy Medical History: Reports: Lung Cancer


GI Medical History: 


   Denies: Hepatitis, Hiatal Hernia


Psychiatric Medical History: 


   Denies: Depression


Hematology: Reports: Anemia


   Denies: Sickle Cell Disease





Past Surgical History


Past Surgical History: 


   Denies: Amputation, Mastectomy, Pacemaker





Social History


Smoking Status: Unknown if Ever Smoked


Electronic Cigarette use?: No


Frequency of Alcohol Use: None


Hx Recreational Drug Use: No


Hx Prescription Drug Abuse: No





Family History


Family History: Reviewed & Not Pertinent


Parental Family History Reviewed: Yes


Children Family History Reviewed: Yes


Sibling(s) Family History Reviewed.: Yes





Medication/Allergy


Allergies/Adverse Reactions: 


                                        





Penicillins Allergy (Unknown, Verified 10/18/13 12:21)


   


Sulfa (Sulfonamide Antibiotics) Allergy (Unknown, Verified 10/18/13 12:21)


   











Review of Systems


Constitutional: ABSENT: chills, fever(s), headache(s), weight gain, weight loss


Eyes: ABSENT: visual disturbances


Ears: ABSENT: hearing changes


Cardiovascular: ABSENT: chest pain, dyspnea on exertion, edema, orthropnea, 

palpitations


Respiratory: ABSENT: cough, hemoptysis


Gastrointestinal: ABSENT: abdominal pain, constipation, diarrhea, hematemesis, 

hematochezia, nausea, vomiting


Genitourinary: ABSENT: dysuria, hematuria


Musculoskeletal: ABSENT: joint swelling


Integumentary: ABSENT: rash, wounds


Neurological: ABSENT: abnormal gait, abnormal speech, confusion, dizziness, 

focal weakness, syncope


Psychiatric: ABSENT: anxiety, depression, homidical ideation, suicidal ideation


Endocrine: ABSENT: cold intolerance, heat intolerance, polydipsia, polyuria


Hematologic/Lymphatic: ABSENT: easy bleeding, easy bruising





Physical Exam


Vital Signs: 


                                        











Temp Pulse Resp BP Pulse Ox


 


 103.8 F H     29 H  88/50 L  97 


 


 10/02/20 06:57     10/02/20 07:01  10/02/20 07:00  10/02/20 07:01











General appearance: PRESENT: no acute distress


Head exam: PRESENT: atraumatic


Respiratory exam: PRESENT: clear to auscultation raymundo.  ABSENT: rales, rhonchi, 

wheezes


Cardiovascular exam: PRESENT: RRR.  ABSENT: diastolic murmur, rubs, systolic 

murmur





Results


Laboratory Results: 


                                        





                                 10/02/20 06:08 





                                 10/02/20 06:08 





                                        











  10/02/20 10/02/20





  06:08 06:08


 


WBC  20.2 H 


 


RBC  3.47 L 


 


Hgb  10.3 L 


 


Hct  31.5 L 


 


MCV  91 


 


MCH  29.8 


 


MCHC  32.8 


 


RDW  16.5 H 


 


Plt Count  205 


 


Seg Neutrophils %  Not Reportable 


 


Sodium   138.1


 


Potassium   3.5 L


 


Chloride   105


 


Carbon Dioxide   24


 


Anion Gap   9


 


BUN   36 H


 


Creatinine   1.48 H


 


Est GFR (African Amer)   41 L


 


Glucose   85


 


Calcium   7.7 L


 


Phosphorus   1.9 L


 


Magnesium   2.2











Impressions: 


                                        





Chest X-Ray  10/02/20 07:16


IMPRESSION:  Resolved left pleural effusion.  No acute cardiopulmonary disease.


 














Assessment & Plan





- Diagnosis


(1) Lung cancer


Qualifiers: 


   Laterality: left   Lung location: lower lobe of lung   Qualified Code(s): 

C34.32 - Malignant neoplasm of lower lobe, left bronchus or lung   


Is this a current diagnosis for this admission?: Yes   


Plan: 


Not a candidate for further therapy.  I had a long discussion with the son and 

family we will keep her for 24-hour observation for IV fluids and probably get 

her back home on hospice tomorrow.  








(2) UTI (urinary tract infection)


Qualifiers: 


   Urinary tract infection type: catheter-associated UTI   Indwelling urinary 

catheter type: indwelling urethral catheter   Encounter type: initial encounter 

 Qualified Code(s): T83.511A - Infection and inflammatory reaction due to 

indwelling urethral catheter, initial encounter; N39.0 - Urinary tract 

infection, site not specified   


Is this a current diagnosis for this admission?: Yes   


Plan: 


Patient came with urinary catheter, probable catheter associated infection but 

we need to keep the catheter in.  We will plan for antibiotics.








(3) Dehydration


Is this a current diagnosis for this admission?: Yes   


Plan: 


Fluids for 24 hours








(4) Fever


Qualifiers: 


   Fever type: due to other condition   Qualified Code(s): R50.81 - Fever 

presenting with conditions classified elsewhere   


Is this a current diagnosis for this admission?: Yes   


Plan: 


Fever secondary to UTI, continue antibiotics as above








- Time


Time Spent: Greater than 70 Minutes

## 2020-10-02 NOTE — RADIOLOGY REPORT (SQ)
EXAM DESCRIPTION:  CHEST SINGLE VIEW



IMAGES COMPLETED DATE/TIME:  10/2/2020 6:37 am



REASON FOR STUDY:  metastatic cancer/cll/fever



COMPARISON:  CT chest, abdomen and pelvis, 9/23/2020.  Chest radiograph, 3/28/2020.



EXAM PARAMETERS:  NUMBER OF VIEWS: One view.

TECHNIQUE: Single frontal radiographic view of the chest acquired.

RADIATION DOSE: NA

LIMITATIONS: None.



FINDINGS:  LUNGS AND PLEURA: Post treatment changes in the left lung are stable.  Resolved left effus
ion.  No pneumothorax.

MEDIASTINUM AND HILAR STRUCTURES: No masses.  Contour normal.

HEART AND VASCULAR STRUCTURES: Heart normal in size.  Normal vasculature.

BONES: No acute findings.

HARDWARE: None in the chest.

OTHER: No other significant finding.



IMPRESSION:  Resolved left pleural effusion.  No acute cardiopulmonary disease.



TECHNICAL DOCUMENTATION:  JOB ID:  [id number]

 2011 [employer]- All Rights Reserved



Reading location - IP/workstation name: [email or web address]

## 2020-10-02 NOTE — ER DOCUMENT REPORT
ED General





- General


Stated Complaint: LETHARGIC,DEHYDRATION,DROWSY


Time Seen by Provider: 10/02/20 05:46


Primary Care Provider: 


KUNAL BYRD MD [Primary Care Provider] - Follow up as needed


TRAVEL OUTSIDE OF THE [place] IN LAST 30 DAYS: No





- HPI


Notes: 





74-year-old female presents with concerns for dehydration.  Information is 

provided by patient's son, Dr. Byrd who is 1 of our inpatient doctors.  He 

states that his mother has a history of metastatic lung cancer with mets to 

brain, she also has CLL.  She had a recent work-up which revealed a brain mets, 

she underwent radiation treatment.  Has developed some right side paralysis, had

recent MRIs which were negative for stroke.  Additionally has seizures and was 

recently loaded with multiple antiepileptics.  Earlier this week had lab work 

done which had some electrolyte abnormalities which were replaced.  She has been

started on home hospice.  A scopolamine patch was placed on her yesterday, 

family has noted that she has been more drowsy than usual and her pupils have 

been dilated.  Scolding patch was removed at home.  She has had decreased oral 

intake for the past day, concern for dehydration, would like her to have fluids 

given.  





- Related Data


Allergies/Adverse Reactions: 


                                        





Penicillins Allergy (Unknown, Verified 10/18/13 12:21)


   


Sulfa (Sulfonamide Antibiotics) Allergy (Unknown, Verified 10/18/13 12:21)


   











Past Medical History





- General


Information source: Relative





- Social History


Smoking Status: Unknown if Ever Smoked


Family History: Reviewed & Not Pertinent





- Past Medical History


Cardiac Medical History: Reports: Hx Hypertension


   Denies: Hx Heart Attack


Pulmonary Medical History: 


   Denies: Hx Asthma, Hx Tuberculosis


Neurological Medical History: Denies: Hx Cerebrovascular Accident, Hx Seizures


Renal/ Medical History: Denies: Hx Peritoneal Dialysis


Malignancy Medical History: Reports: Hx Lung Cancer


GI Medical History: Denies: Hx Hepatitis, Hx Hiatal Hernia, Hx Ulcer


Psychiatric Medical History: 


   Denies: Hx Depression


Infectious Medical History: Denies: Hx Hepatitis


Past Surgical History: Denies: Hx Mastectomy, Hx Open Heart Surgery, Hx 

Pacemaker





- Immunizations


Hx Diphtheria, Pertussis, Tetanus Vaccination: No





Review of Systems





- Review of Systems


-: Yes ROS unobtainable due to patient's medical condition





Physical Exam





- General


General appearance: Other - Chronically ill-appearing


In distress: None





- HEENT


Head: Normocephalic, Atraumatic


Extraocular movements intact: Yes


Pupils: PERRL, Dilated





- Respiratory


Breath sounds: Rhonchi





- Cardiovascular


Rhythm: Tachycardia


Heart sounds: Normal auscultation





- Abdominal


Distension: No distension


Tenderness: Nontender





- Extremities


General lower extremity: No: Edema





- Neurological


Notes: 





Patient regards son and communicates with him in native tongue





- Psychological


Associated symptoms: Other - Unable to assess





- Skin


Skin Temperature: Warm


Skin Color: Ashen





Course





- Re-evaluation


Re-evalutation: 





74-year-old female with metastatic cancer on hospice care here for drowsiness 

associated with scopolamine patch, some decreased oral intake as well.  Patient 

is otherwise at her baseline per family.  She is chronically ill-appearing, does

communicate with her family in native tongue.  Goals of care addressed with 

family, would like electrolytes checked and fluids to be given.  I feel that 

this is reasonable.  D5LR infusion ordered.  Patient is DNR/DNI.





Patient care to be handed off to Dr. Valerio, pending laboratory evaluation





Discharge





- Discharge


Clinical Impression: 


 Poor fluid intake





Disposition: HOME, SELF-CARE


Referrals: 


KUNAL BYRD MD [Primary Care Provider] - Follow up as needed

## 2020-10-02 NOTE — PDOC H&P
History of Present Illness


Admission Date/PCP: 


  10/02/20 08:29





  AMEYA KING MD





Patient complains of: Chnage in mental status


History of Present Illness: 


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 

brought to the ED due to observed change in mentation and responsiveness by her 

. Family reported that patient had scopolamine patch placed yesterday due

to increase salivation. She subsequently became drowsy with dilated pupils. 

Family removed Scopolamine patch prior to arrival in the ED. Also, family 

reported poor oral intake with concern about dehydration. Her morbidities 

include metastatic left lower lobe lung cancer with brain involvement s/p gamma 

knife and chemotherapy management, seizure disorder with recent protracted 

episode and difficult medical management, and CLL. Her initial ED evaluation was

significant for leukocytosis, electrolytes derangement, and acute renal injury. 

She will be admitted to observation bed for further evaluation and management.





Past Medical History


Cardiac Medical History: Reports: Hypertension


   Denies: Myocardial Infarction


Pulmonary Medical History: 


   Denies: Asthma, Tuberculosis


Neurological Medical History: 


   Denies: Seizures


Malignancy Medical History: Reports: Lung Cancer


GI Medical History: 


   Denies: Hepatitis, Hiatal Hernia


Psychiatric Medical History: 


   Denies: Depression


Hematology: Reports: Anemia


   Denies: Sickle Cell Disease





Past Surgical History


Past Surgical History: 


   Denies: Amputation, Mastectomy, Pacemaker





Social History


Smoking Status: Unknown if Ever Smoked


Electronic Cigarette use?: No


Frequency of Alcohol Use: None


Hx Recreational Drug Use: No


Hx Prescription Drug Abuse: No





- Advance Directive


Resuscitation Status: Do Not Resuscitate





Family History


Family History: Reviewed & Not Pertinent


Parental Family History Reviewed: Yes


Children Family History Reviewed: Yes


Sibling(s) Family History Reviewed.: Yes





Medication/Allergy


Home Medications: 








Atenolol [Tenormin 50 mg Tablet] 50 mg PO DAILY 10/02/20 


Cholecalciferol (Vitamin D3) [Vitamin D] 50,000 unit PO MO@1000 10/02/20 


Dexamethasone [Decadron 4 Mg Tablet] 4 mg PO DAILY 10/02/20 


Esomeprazole Magnesium 40 mg PO DAILY 10/02/20 


Famotidine [Pepcid 20 mg Tablet] 20 mg PO DAILY 10/02/20 


Folic Acid 1 mg PO DAILY 10/02/20 


Levetiracetam [Keppra 500 mg Tablet] 500 mg PO Q12 10/02/20 


Levothyroxine Sodium [Synthroid 0.1 mg Tablet] 0.1 mg PO DAILY 10/02/20 


Lorazepam [Ativan 0.5 mg Tablet] 0.5 mg PO Q8HP PRN 10/02/20 








Allergies/Adverse Reactions: 


                                        





Penicillins Allergy (Unknown, Verified 10/18/13 12:21)


   


Sulfa (Sulfonamide Antibiotics) Allergy (Unknown, Verified 10/18/13 12:21)


   











Review of Systems


Constitutional: PRESENT: fatigue, weakness


Nose, Mouth, and Throat: ABSENT: headache(s)


Cardiovascular: PRESENT: dyspnea on exertion - on supplemental oxygen therapy.  

ABSENT: chest pain


Respiratory: PRESENT: cough - with minimal sputum production


Gastrointestinal: PRESENT: other - poor oral intake..  ABSENT: abdominal pain, 

nausea, vomiting


Genitourinary: PRESENT: other - indwelling tovar catheter


Neurological: PRESENT: abnormal gait - due to generalized weakness, convulsions 

- per history but none since discharge on Vimpat therapy.


Hematologic/Lymphatic: ABSENT: easy bleeding





Physical Exam


Vital Signs: 


                                        











Temp Pulse Resp BP Pulse Ox


 


 98.3 F      27 H  104/59 L  100 


 


 10/02/20 08:27     10/02/20 12:30  10/02/20 12:30  10/02/20 10:12








                                 Intake & Output











 10/01/20 10/02/20 10/03/20





 06:59 06:59 06:59


 


Intake Total   1050


 


Balance   1050


 


Weight   37.966 kg











General appearance: PRESENT: mild distress - on supplemental oxygen via nasal 

cannula at 3L/min, thin


Head exam: PRESENT: atraumatic, normocephalic


Eye exam: PRESENT: conjunctiva pink.  ABSENT: scleral icterus


Respiratory exam: PRESENT: decreased breath sounds - at left lower lung zone


Cardiovascular exam: PRESENT: RRR, +S1, +S2.  ABSENT: diastolic murmur, rubs, 

systolic murmur


Vascular exam: ABSENT: pallor


GI/Abdominal exam: PRESENT: normal bowel sounds, soft.  ABSENT: distended, 

guarding, mass, organolmegaly, rebound, tenderness


Extremities exam: ABSENT: pedal edema


Neurological exam: PRESENT: alert, awake


Skin exam: PRESENT: dry, warm





Results


Laboratory Results: 


                                        





                                 10/02/20 06:08 





                                 10/02/20 06:08 





                                        











  10/02/20 10/02/20 10/02/20





  06:08 06:08 14:10


 


WBC  20.2 H  


 


RBC  3.47 L  


 


Hgb  10.3 L  


 


Hct  31.5 L  


 


MCV  91  


 


MCH  29.8  


 


MCHC  32.8  


 


RDW  16.5 H  


 


Plt Count  205  


 


Seg Neutrophils %  Not Reportable  


 


Sodium   138.1 


 


Potassium   3.5 L 


 


Chloride   105 


 


Carbon Dioxide   24 


 


Anion Gap   9 


 


BUN   36 H 


 


Creatinine   1.48 H 


 


Est GFR ( Amer)   41 L 


 


Glucose   85 


 


Calcium   7.7 L 


 


Phosphorus   1.9 L 


 


Magnesium   2.2 


 


Urine Color    REYMUNDO


 


Urine Appearance    CLOUDY


 


Urine pH    5.0


 


Ur Specific Gravity    1.027


 


Urine Protein    30 H


 


Urine Glucose (UA)    50 H


 


Urine Ketones    TRACE H


 


Urine Blood    SMALL H


 


Urine Nitrite    NEGATIVE


 


Ur Leukocyte Esterase    TRACE H


 


Urine WBC (Auto)    15


 


Urine RBC (Auto)    8











Impressions: 


                                        





Chest X-Ray  10/02/20 07:16


IMPRESSION:  Resolved left pleural effusion.  No acute cardiopulmonary disease.


 














Assessment & Plan





- Diagnosis


(1) Probable sepsis


Is this a current diagnosis for this admission?: Yes   


Plan: 


See attending physician orders for details about care plan.








(2) Acute renal injury due to hypovolemia


Is this a current diagnosis for this admission?: Yes   


Plan: 


See attending physician orders for details about care plan.








(3) Hypokalemia due to inadequate potassium intake


Is this a current diagnosis for this admission?: Yes   


Plan: 


See attending physician orders for details about care plan.








(4) Seizure disorder


Is this a current diagnosis for this admission?: Yes   


Plan: 


See attending physician orders for details about care plan.








(5) Lung cancer metastatic to brain


Is this a current diagnosis for this admission?: Yes   


Plan: 


See attending physician orders for details about care plan.








(6) History of chronic lymphocytic leukemia


Is this a current diagnosis for this admission?: Yes   


Plan: 


See attending physician orders for details about care plan.








(7) Malnutrition


Qualifiers: 


   Malnutrition type: protein-calorie malnutrition   Protein-calorie 

malnutrition severity: moderate   Qualified Code(s): E44.0 - Moderate protein-

calorie malnutrition   


Is this a current diagnosis for this admission?: Yes   


Plan: 


See attending physician orders for details about care plan.








- Time


Time Spent: 50 to 70 Minutes


Medications reviewed and adjusted accordingly: Yes


Anticipated Discharge Disposition: Home with Home Health


Anticipated Discharge Timeframe: within 72 hours





- Inpatient Certification


Based on my medical assessment, after consideration of the patient's 

comorbidities, presenting symptoms, or acuity I expect that the services needed 

warrant INPATIENT care.: Yes


I certify that my determination is in accordance with my understanding of 

Medicare's requirements for reasonable and necessary INPATIENT services [42 CFR 

412.3e].: Yes


Medical Necessity: Significant Comorbidiites Make Outpatient Treatment Too 

Risky, Need Close Monitoring Due to Risk of Patient Decompensation, Need For IV 

Fluids, Need for IV Antibiotics, Risk of Complication if Not Cared For in 

Hospital, Risk of Diagnosis Which Will Require Inpatient Eval/Care/Monitoring


Post Hospital Care: D/C Planner Documentation





- Plan Summary


Plan Summary: 





See attending physician orders for details about care plan.

## 2020-10-02 NOTE — ADVANCED CARE
- Diagnosis


(1) Probable sepsis


Diagnosis Current: Yes   





(2) Acute renal injury due to hypovolemia


Diagnosis Current: Yes   





(3) Hypokalemia due to inadequate potassium intake


Diagnosis Current: Yes   





(4) Seizure disorder


Diagnosis Current: Yes   





(5) Lung cancer metastatic to brain


Diagnosis Current: Yes   





(6) History of chronic lymphocytic leukemia


Diagnosis Current: Yes   





(7) Malnutrition


Diagnosis Current: Yes   


Attendance: 





Dr. Seay, Spouse


Resuscitation Status: Do Not Resuscitate


Discussion: 





Family discontinue hospice service and plan for home health services upon 

discharge.


Care Planning Goals: 





Treat acute correctable illness but maintain on DNR/DNI status.


Document(s) Completed: 





Yes.


Time Spent: 15 mins

## 2020-10-03 LAB
ADD MANUAL DIFF: NO
ADD MANUAL DIFF: NO
ALBUMIN SERPL-MCNC: 1.7 G/DL (ref 3.5–5)
ALP SERPL-CCNC: 108 U/L (ref 38–126)
ANION GAP SERPL CALC-SCNC: 5 MMOL/L (ref 5–19)
ANION GAP SERPL CALC-SCNC: 5 MMOL/L (ref 5–19)
AST SERPL-CCNC: 38 U/L (ref 14–36)
BASOPHILS # BLD AUTO: 0.1 10^3/UL (ref 0–0.2)
BASOPHILS # BLD AUTO: 0.1 10^3/UL (ref 0–0.2)
BASOPHILS NFR BLD AUTO: 0.4 % (ref 0–2)
BASOPHILS NFR BLD AUTO: 0.6 % (ref 0–2)
BILIRUB DIRECT SERPL-MCNC: 0.5 MG/DL (ref 0–0.4)
BILIRUB SERPL-MCNC: 0.1 MG/DL (ref 0.1–1.1)
BILIRUB SERPL-MCNC: 0.7 MG/DL (ref 0.2–1.3)
BUN SERPL-MCNC: 41 MG/DL (ref 7–20)
BUN SERPL-MCNC: 44 MG/DL (ref 7–20)
CALCIUM: 7.1 MG/DL (ref 8.4–10.2)
CALCIUM: 7.2 MG/DL (ref 8.4–10.2)
CHLORIDE SERPL-SCNC: 111 MMOL/L (ref 98–107)
CHLORIDE SERPL-SCNC: 115 MMOL/L (ref 98–107)
CO2 SERPL-SCNC: 18 MMOL/L (ref 22–30)
CO2 SERPL-SCNC: 22 MMOL/L (ref 22–30)
EOSINOPHIL # BLD AUTO: 0.4 10^3/UL (ref 0–0.6)
EOSINOPHIL # BLD AUTO: 0.4 10^3/UL (ref 0–0.6)
EOSINOPHIL NFR BLD AUTO: 2.6 % (ref 0–6)
EOSINOPHIL NFR BLD AUTO: 2.7 % (ref 0–6)
ERYTHROCYTE [DISTWIDTH] IN BLOOD BY AUTOMATED COUNT: 16.3 % (ref 11.5–14)
ERYTHROCYTE [DISTWIDTH] IN BLOOD BY AUTOMATED COUNT: 16.8 % (ref 11.5–14)
GLUCOSE SERPL-MCNC: 113 MG/DL (ref 75–110)
GLUCOSE SERPL-MCNC: 116 MG/DL (ref 75–110)
HCT VFR BLD CALC: 24.2 % (ref 36–47)
HCT VFR BLD CALC: 24.8 % (ref 36–47)
HGB BLD-MCNC: 8 G/DL (ref 12–15.5)
HGB BLD-MCNC: 8.3 G/DL (ref 12–15.5)
LYMPHOCYTES # BLD AUTO: 2.8 10^3/UL (ref 0.5–4.7)
LYMPHOCYTES # BLD AUTO: 3 10^3/UL (ref 0.5–4.7)
LYMPHOCYTES NFR BLD AUTO: 20.7 % (ref 13–45)
LYMPHOCYTES NFR BLD AUTO: 20.8 % (ref 13–45)
MCH RBC QN AUTO: 29.8 PG (ref 27–33.4)
MCH RBC QN AUTO: 30 PG (ref 27–33.4)
MCHC RBC AUTO-ENTMCNC: 33.1 G/DL (ref 32–36)
MCHC RBC AUTO-ENTMCNC: 33.4 G/DL (ref 32–36)
MCV RBC AUTO: 90 FL (ref 80–97)
MCV RBC AUTO: 90 FL (ref 80–97)
MONOCYTES # BLD AUTO: 0.4 10^3/UL (ref 0.1–1.4)
MONOCYTES # BLD AUTO: 0.4 10^3/UL (ref 0.1–1.4)
MONOCYTES NFR BLD AUTO: 2.5 % (ref 3–13)
MONOCYTES NFR BLD AUTO: 2.8 % (ref 3–13)
NEUTROPHILS # BLD AUTO: 10 10^3/UL (ref 1.7–8.2)
NEUTROPHILS # BLD AUTO: 10.8 10^3/UL (ref 1.7–8.2)
NEUTS SEG NFR BLD AUTO: 73.4 % (ref 42–78)
NEUTS SEG NFR BLD AUTO: 73.5 % (ref 42–78)
PLATELET # BLD: 125 10^3/UL (ref 150–450)
PLATELET # BLD: 140 10^3/UL (ref 150–450)
POTASSIUM SERPL-SCNC: 3.2 MMOL/L (ref 3.6–5)
POTASSIUM SERPL-SCNC: 4.5 MMOL/L (ref 3.6–5)
PROT SERPL-MCNC: 3.4 G/DL (ref 6.3–8.2)
RBC # BLD AUTO: 2.68 10^6/UL (ref 3.72–5.28)
RBC # BLD AUTO: 2.77 10^6/UL (ref 3.72–5.28)
TOTAL CELLS COUNTED % (AUTO): 100 %
TOTAL CELLS COUNTED % (AUTO): 100 %
WBC # BLD AUTO: 13.6 10^3/UL (ref 4–10.5)
WBC # BLD AUTO: 14.7 10^3/UL (ref 4–10.5)

## 2020-10-03 RX ADMIN — SODIUM CHLORIDE AND POTASSIUM CHLORIDE PRN MLS/HR: 9; 2.98 INJECTION, SOLUTION INTRAVENOUS at 09:42

## 2020-10-03 RX ADMIN — POTASSIUM CHLORIDE SCH MLS/HR: 29.8 INJECTION, SOLUTION INTRAVENOUS at 14:03

## 2020-10-03 RX ADMIN — LACOSAMIDE SCH MG: 10 INJECTION INTRAVENOUS at 09:41

## 2020-10-03 RX ADMIN — LACOSAMIDE SCH MG: 10 INJECTION INTRAVENOUS at 22:06

## 2020-10-03 RX ADMIN — PANTOPRAZOLE SODIUM SCH MG: 40 INJECTION, POWDER, FOR SOLUTION INTRAVENOUS at 22:07

## 2020-10-03 RX ADMIN — DIBASIC SODIUM PHOSPHATE, MONOBASIC POTASSIUM PHOSPHATE AND MONOBASIC SODIUM PHOSPHATE SCH: 852; 155; 130 TABLET ORAL at 16:08

## 2020-10-03 RX ADMIN — CEFEPIME HYDROCHLORIDE SCH MLS/HR: 1 INJECTION, SOLUTION INTRAVENOUS at 22:06

## 2020-10-03 RX ADMIN — LEVOTHYROXINE SODIUM SCH: 100 TABLET ORAL at 09:27

## 2020-10-03 RX ADMIN — PANTOPRAZOLE SODIUM SCH MG: 40 INJECTION, POWDER, FOR SOLUTION INTRAVENOUS at 09:41

## 2020-10-03 RX ADMIN — DIBASIC SODIUM PHOSPHATE, MONOBASIC POTASSIUM PHOSPHATE AND MONOBASIC SODIUM PHOSPHATE SCH: 852; 155; 130 TABLET ORAL at 21:56

## 2020-10-03 RX ADMIN — ENOXAPARIN SODIUM SCH: 30 INJECTION SUBCUTANEOUS at 09:42

## 2020-10-03 RX ADMIN — DIBASIC SODIUM PHOSPHATE, MONOBASIC POTASSIUM PHOSPHATE AND MONOBASIC SODIUM PHOSPHATE SCH: 852; 155; 130 TABLET ORAL at 07:40

## 2020-10-03 RX ADMIN — DIBASIC SODIUM PHOSPHATE, MONOBASIC POTASSIUM PHOSPHATE AND MONOBASIC SODIUM PHOSPHATE SCH: 852; 155; 130 TABLET ORAL at 10:59

## 2020-10-03 RX ADMIN — SODIUM CHLORIDE PRN MLS/HR: 9 INJECTION, SOLUTION INTRAVENOUS at 17:57

## 2020-10-03 RX ADMIN — CEFEPIME HYDROCHLORIDE SCH MLS/HR: 1 INJECTION, SOLUTION INTRAVENOUS at 09:41

## 2020-10-03 RX ADMIN — POTASSIUM CHLORIDE SCH MLS/HR: 29.8 INJECTION, SOLUTION INTRAVENOUS at 16:01

## 2020-10-03 NOTE — PDOC PROGRESS REPORT
Subjective


Progress Note for:: 10/03/20


Subjective:: 


Patient lethargic today, did not sleep through the night.  But was active 

through the night and has been swallowing.  Urine output has been decreased.





Reason For Visit: 


POOR FLUID INTAKE,LUNG CANCER.








Physical Exam


Vital Signs: 


                                        











Temp Pulse Resp BP Pulse Ox


 


 100.0 F   118 H  18   118/56 L  100 


 


 10/03/20 08:55  10/03/20 08:04  10/03/20 08:04  10/03/20 08:04  10/03/20 08:04








                                 Intake & Output











 10/02/20 10/03/20 10/04/20





 06:59 06:59 06:59


 


Intake Total  1100 1050


 


Output Total  100 


 


Balance  1000 1050


 


Weight  37.8 kg 











General appearance: PRESENT: no acute distress


Head exam: PRESENT: atraumatic


Neck exam: ABSENT: carotid bruit, JVD, lymphadenopathy, thyromegaly


Respiratory exam: PRESENT: clear to auscultation raymundo.  ABSENT: rales, rhonchi, 

wheezes


Cardiovascular exam: PRESENT: RRR.  ABSENT: diastolic murmur, rubs, systolic 

murmur


GI/Abdominal exam: PRESENT: normal bowel sounds, soft.  ABSENT: distended, 

guarding, mass, organolmegaly, rebound, tenderness


Rectal exam: PRESENT: deferred


Neurological exam: PRESENT: altered





Results


Laboratory Results: 


                                        





                                 10/03/20 06:10 





                                 10/03/20 06:10 





                                        











  10/02/20 10/03/20 10/03/20





  14:10 06:10 06:10


 


WBC   14.7 H 


 


RBC   2.77 L 


 


Hgb   8.3 L 


 


Hct   24.8 L 


 


MCV   90 


 


MCH   30.0 


 


MCHC   33.4 


 


RDW   16.3 H 


 


Plt Count   140 L 


 


Seg Neutrophils %   73.4 


 


Sodium    138.2


 


Potassium    3.2 L


 


Chloride    111 H


 


Carbon Dioxide    22


 


Anion Gap    5


 


BUN    41 H


 


Creatinine    1.43 H


 


Est GFR ( Amer)    43 L


 


Glucose    113 H


 


Calcium    7.2 L


 


Total Bilirubin    0.7


 


AST    38 H


 


Alkaline Phosphatase    108


 


Total Protein    3.4 L


 


Albumin    1.7 L


 


Urine Color  REYMUNDO  


 


Urine Appearance  CLOUDY  


 


Urine pH  5.0  


 


Ur Specific Gravity  1.027  


 


Urine Protein  30 H  


 


Urine Glucose (UA)  50 H  


 


Urine Ketones  TRACE H  


 


Urine Blood  SMALL H  


 


Urine Nitrite  NEGATIVE  


 


Ur Leukocyte Esterase  TRACE H  


 


Urine WBC (Auto)  15  


 


Urine RBC (Auto)  8  











Impressions: 


                                        





Chest X-Ray  10/02/20 07:16


IMPRESSION:  Resolved left pleural effusion.  No acute cardiopulmonary disease.


 














Assessment & Plan





- Diagnosis


(1) Lung cancer


Qualifiers: 


   Laterality: left   Lung location: lower lobe of lung   Qualified Code(s): 

C34.32 - Malignant neoplasm of lower lobe, left bronchus or lung   


Is this a current diagnosis for this admission?: Yes   


Plan: 


As noted no further treatment planned, initially patient was on hospice but as 

patient was improving family decided against further hospice, continuing acute 

care with treatment for the UTI and dehydration, home health services on 

discharge with [facility].








(2) UTI (urinary tract infection)


Qualifiers: 


   Urinary tract infection type: catheter-associated UTI   Indwelling urinary 

catheter type: indwelling urethral catheter   Encounter type: initial encounter 

 Qualified Code(s): T83.511A - Infection and inflammatory reaction due to 

indwelling urethral catheter, initial encounter; N39.0 - Urinary tract 

infection, site not specified   


Is this a current diagnosis for this admission?: Yes   


Plan: 


Continue current antibiotic therapy








(3) Dehydration


Is this a current diagnosis for this admission?: Yes   


Plan: 


Still continued, urine output is decreased, fluids will be increased








(4) Fever


Qualifiers: 


   Fever type: due to other condition   Qualified Code(s): R50.81 - Fever 

presenting with conditions classified elsewhere   


Is this a current diagnosis for this admission?: Yes   


Plan: 


Still present, may be related to the UTI








- Time


Time Spent with patient: 35 or more minutes

## 2020-10-03 NOTE — PDOC PROGRESS REPORT
Subjective


Progress Note for:: 10/03/20


Subjective:: 





PO intake encouraging. No significant bowel movement. Urine output remain low. 

No fever. No difficulty with breathing. remain on supplemental oxygen via nasal 

cannula.


Reason For Visit: 


POOR FLUID INTAKE,LUNG CANCER.








Physical Exam


Vital Signs: 


                                        











Temp Pulse Resp BP Pulse Ox


 


 100.0 F   118 H  18   118/56 L  100 


 


 10/03/20 08:55  10/03/20 08:04  10/03/20 08:04  10/03/20 08:04  10/03/20 08:04








                                 Intake & Output











 10/02/20 10/03/20 10/04/20





 06:59 06:59 06:59


 


Intake Total  1100 1050


 


Output Total  100 


 


Balance  1000 1050


 


Weight  37.8 kg 











General appearance: PRESENT: no acute distress


Head exam: PRESENT: atraumatic, normocephalic


Eye exam: PRESENT: conjunctiva pink


Mouth exam: PRESENT: moist


Respiratory exam: PRESENT: clear to auscultation raymundo, decreased breath sounds - 

left lower lung zone


Cardiovascular exam: PRESENT: RRR, +S1, +S2.  ABSENT: rubs


Vascular exam: ABSENT: pallor


GI/Abdominal exam: PRESENT: normal bowel sounds, soft


Gentrourinary exam: PRESENT: indwelling catheter


Extremities exam: ABSENT: pedal edema


Neurological exam: PRESENT: alert, awake


Skin exam: PRESENT: dry, warm





Results


Laboratory Results: 


                                        





                                 10/03/20 06:10 





                                 10/03/20 06:10 





                                        











  10/02/20 10/03/20 10/03/20





  14:10 06:10 06:10


 


WBC   14.7 H 


 


RBC   2.77 L 


 


Hgb   8.3 L 


 


Hct   24.8 L 


 


MCV   90 


 


MCH   30.0 


 


MCHC   33.4 


 


RDW   16.3 H 


 


Plt Count   140 L 


 


Seg Neutrophils %   73.4 


 


Sodium    138.2


 


Potassium    3.2 L


 


Chloride    111 H


 


Carbon Dioxide    22


 


Anion Gap    5


 


BUN    41 H


 


Creatinine    1.43 H


 


Est GFR ( Amer)    43 L


 


Glucose    113 H


 


Calcium    7.2 L


 


Total Bilirubin    0.7


 


AST    38 H


 


Alkaline Phosphatase    108


 


Total Protein    3.4 L


 


Albumin    1.7 L


 


Urine Color  REYMUNDO  


 


Urine Appearance  CLOUDY  


 


Urine pH  5.0  


 


Ur Specific Gravity  1.027  


 


Urine Protein  30 H  


 


Urine Glucose (UA)  50 H  


 


Urine Ketones  TRACE H  


 


Urine Blood  SMALL H  


 


Urine Nitrite  NEGATIVE  


 


Ur Leukocyte Esterase  TRACE H  


 


Urine WBC (Auto)  15  


 


Urine RBC (Auto)  8  











Impressions: 


                                        





Chest X-Ray  10/02/20 07:16


IMPRESSION:  Resolved left pleural effusion.  No acute cardiopulmonary disease.


 














Assessment & Plan





- Diagnosis


(1) Probable sepsis


Is this a current diagnosis for this admission?: Yes   





(2) Acute renal injury due to hypovolemia


Is this a current diagnosis for this admission?: Yes   





(3) Hypokalemia due to inadequate potassium intake


Is this a current diagnosis for this admission?: Yes   





(4) Seizure disorder


Is this a current diagnosis for this admission?: Yes   





(5) Lung cancer metastatic to brain


Is this a current diagnosis for this admission?: Yes   





(6) History of chronic lymphocytic leukemia


Is this a current diagnosis for this admission?: Yes   





(7) Malnutrition


Qualifiers: 


   Malnutrition type: protein-calorie malnutrition   Protein-calorie 

malnutrition severity: moderate   Qualified Code(s): E44.0 - Moderate 

protein-calorie malnutrition   


Is this a current diagnosis for this admission?: Yes   





- Time


Time Spent with patient: 25-34 minutes


Level of Care: MEDICAL


Medications reviewed and adjusted accordingly: Yes


Anticipated discharge: Home with Homehealth


Anticipated DC Timeframe: within 72 hours





- Inpatient Certification


Based on my medical assessment, after consideration of the patient's 

comorbidities, presenting symptoms, or acuity I expect that the services needed 

warrant INPATIENT care.: Yes


I certify that my determination is in accordance with my understanding of 

Medicare's requirements for reasonable and necessary INPATIENT services [42 CFR 

412.3e].: Yes


Medical Necessity: Significant Comorbidiites Make Outpatient Treatment Too 

Risky, Need Close Monitoring Due to Risk of Patient Decompensation, Need For IV 

Fluids, Need for IV Antibiotics, Risk of Complication if Not Cared For in 

Hospital, Risk of Diagnosis Which Will Require Inpatient Eval/Care/Monitoring


Post Hospital Care: D/C Planner Documentation





- Plan Summary


Plan Summary: 





Potassium supplementation with 40 mEq rider. Obtain serum Magnesium level. 

Ensure pudding for nutritional supplementation. Increase IV fluid rate to 100 

ml/hour. Obtain CBC with Diff and BMP in AM.

## 2020-10-04 VITALS — DIASTOLIC BLOOD PRESSURE: 51 MMHG | SYSTOLIC BLOOD PRESSURE: 104 MMHG

## 2020-10-04 LAB
ADD MANUAL DIFF: NO
ANION GAP SERPL CALC-SCNC: 7 MMOL/L (ref 5–19)
BASOPHILS # BLD AUTO: 0.1 10^3/UL (ref 0–0.2)
BASOPHILS NFR BLD AUTO: 0.7 % (ref 0–2)
BUN SERPL-MCNC: 43 MG/DL (ref 7–20)
CALCIUM: 6.9 MG/DL (ref 8.4–10.2)
CHLORIDE SERPL-SCNC: 116 MMOL/L (ref 98–107)
CO2 SERPL-SCNC: 17 MMOL/L (ref 22–30)
EOSINOPHIL # BLD AUTO: 0.2 10^3/UL (ref 0–0.6)
EOSINOPHIL NFR BLD AUTO: 1.7 % (ref 0–6)
ERYTHROCYTE [DISTWIDTH] IN BLOOD BY AUTOMATED COUNT: 17 % (ref 11.5–14)
GLUCOSE SERPL-MCNC: 77 MG/DL (ref 75–110)
HCT VFR BLD CALC: 25.2 % (ref 36–47)
HGB BLD-MCNC: 8.4 G/DL (ref 12–15.5)
LYMPHOCYTES # BLD AUTO: 2.1 10^3/UL (ref 0.5–4.7)
LYMPHOCYTES NFR BLD AUTO: 18.3 % (ref 13–45)
MCH RBC QN AUTO: 30.1 PG (ref 27–33.4)
MCHC RBC AUTO-ENTMCNC: 33.1 G/DL (ref 32–36)
MCV RBC AUTO: 91 FL (ref 80–97)
MONOCYTES # BLD AUTO: 0.2 10^3/UL (ref 0.1–1.4)
MONOCYTES NFR BLD AUTO: 1.9 % (ref 3–13)
NEUTROPHILS # BLD AUTO: 8.9 10^3/UL (ref 1.7–8.2)
NEUTS SEG NFR BLD AUTO: 77.4 % (ref 42–78)
PHOSPHATE SERPL-MCNC: 1.9 MG/DL (ref 2.5–4.5)
PLATELET # BLD: 104 10^3/UL (ref 150–450)
POTASSIUM SERPL-SCNC: 4.3 MMOL/L (ref 3.6–5)
RBC # BLD AUTO: 2.78 10^6/UL (ref 3.72–5.28)
TOTAL CELLS COUNTED % (AUTO): 100 %
WBC # BLD AUTO: 11.5 10^3/UL (ref 4–10.5)

## 2020-10-04 RX ADMIN — PANTOPRAZOLE SODIUM SCH MG: 40 INJECTION, POWDER, FOR SOLUTION INTRAVENOUS at 09:58

## 2020-10-04 RX ADMIN — DIBASIC SODIUM PHOSPHATE, MONOBASIC POTASSIUM PHOSPHATE AND MONOBASIC SODIUM PHOSPHATE SCH: 852; 155; 130 TABLET ORAL at 11:04

## 2020-10-04 RX ADMIN — MORPHINE SULFATE SCH MG: 10 INJECTION INTRAMUSCULAR; INTRAVENOUS; SUBCUTANEOUS at 20:12

## 2020-10-04 RX ADMIN — LACOSAMIDE SCH MG: 10 INJECTION INTRAVENOUS at 09:58

## 2020-10-04 RX ADMIN — DIBASIC SODIUM PHOSPHATE, MONOBASIC POTASSIUM PHOSPHATE AND MONOBASIC SODIUM PHOSPHATE SCH: 852; 155; 130 TABLET ORAL at 08:32

## 2020-10-04 RX ADMIN — ALBUMIN (HUMAN) SCH: 12.5 SOLUTION INTRAVENOUS at 13:17

## 2020-10-04 RX ADMIN — ALBUMIN (HUMAN) SCH MLS/HR: 12.5 SOLUTION INTRAVENOUS at 10:44

## 2020-10-04 RX ADMIN — MORPHINE SULFATE SCH MG: 10 INJECTION INTRAMUSCULAR; INTRAVENOUS; SUBCUTANEOUS at 16:07

## 2020-10-04 RX ADMIN — LEVOTHYROXINE SODIUM SCH: 100 TABLET ORAL at 09:59

## 2020-10-04 RX ADMIN — ENOXAPARIN SODIUM SCH: 30 INJECTION SUBCUTANEOUS at 09:55

## 2020-10-04 RX ADMIN — ALBUMIN (HUMAN) SCH: 12.5 SOLUTION INTRAVENOUS at 12:24

## 2020-10-04 RX ADMIN — MORPHINE SULFATE SCH MG: 10 INJECTION INTRAMUSCULAR; INTRAVENOUS; SUBCUTANEOUS at 14:01

## 2020-10-04 RX ADMIN — ALBUMIN (HUMAN) SCH: 12.5 SOLUTION INTRAVENOUS at 11:48

## 2020-10-04 RX ADMIN — CEFEPIME HYDROCHLORIDE SCH MLS/HR: 1 INJECTION, SOLUTION INTRAVENOUS at 09:58

## 2020-10-04 RX ADMIN — MORPHINE SULFATE SCH MG: 10 INJECTION INTRAMUSCULAR; INTRAVENOUS; SUBCUTANEOUS at 18:18

## 2020-10-04 RX ADMIN — MORPHINE SULFATE SCH: 10 INJECTION INTRAMUSCULAR; INTRAVENOUS; SUBCUTANEOUS at 23:02

## 2020-10-04 RX ADMIN — SODIUM CHLORIDE PRN MLS/HR: 9 INJECTION, SOLUTION INTRAVENOUS at 09:58

## 2020-10-04 NOTE — PDOC PROGRESS REPORT
Subjective


Progress Note for:: 10/04/20


Subjective:: 


Pt declined overnight, now w/ breathing changes c/w end of life. D/w family and 

will begin comfort care measures, started morphine, will d/c all other unneeded 

meds.





Reason For Visit: 


PROBABLE SEPSIS,ACUTE KIDNEY INJURY,HYPOKALEMIA,SE








Physical Exam


Vital Signs: 


                                        











Temp Pulse Resp BP Pulse Ox


 


 97.6 F   95   30 H  140/58 H  82 L


 


 10/04/20 10:00  10/04/20 09:37  10/04/20 09:37  10/04/20 00:20  10/04/20 09:37








                                 Intake & Output











 10/03/20 10/04/20 10/05/20





 06:59 06:59 06:59


 


Intake Total 1100 2389 1150


 


Output Total 100 265 


 


Balance 1000 2124 1150


 


Weight 37.8 kg  











General appearance: PRESENT: mild distress


Head exam: PRESENT: atraumatic


Respiratory exam: PRESENT: accessory muscle use, retraction, rhonchi, tachypnea


Cardiovascular exam: PRESENT: irregular rhythm


GI/Abdominal exam: PRESENT: diminished bowel sounds


Rectal exam: PRESENT: deferred


Neurological exam: PRESENT: altered, aphasic





Results


Laboratory Results: 


                                        





                                 10/04/20 06:00 





                                 10/04/20 06:00 





                                        











  10/03/20 10/03/20 10/03/20





  06:10 18:20 18:36


 


WBC   13.6 H 


 


RBC   2.68 L 


 


Hgb   8.0 L 


 


Hct   24.2 L 


 


MCV   90 


 


MCH   29.8 


 


MCHC   33.1 


 


RDW   16.8 H 


 


Plt Count   125 L 


 


Seg Neutrophils %   73.5 


 


Sodium    138.4


 


Potassium    4.5  D


 


Chloride    115 H


 


Carbon Dioxide    18 L


 


Anion Gap    5


 


BUN    44 H


 


Creatinine    1.38 H


 


Est GFR ( Amer)    45 L


 


Glucose    116 H


 


Lactic Acid   


 


Calcium    7.1 L


 


Phosphorus   


 


Magnesium  1.8  














  10/03/20 10/04/20 10/04/20





  18:36 06:00 06:00


 


WBC    11.5 H


 


RBC    2.78 L


 


Hgb    8.4 L


 


Hct    25.2 L


 


MCV    91


 


MCH    30.1


 


MCHC    33.1


 


RDW    17.0 H


 


Plt Count    104 L


 


Seg Neutrophils %    77.4


 


Sodium   140.3 


 


Potassium   4.3 


 


Chloride   116 H 


 


Carbon Dioxide   17 L 


 


Anion Gap   7 


 


BUN   43 H 


 


Creatinine   1.22 


 


Est GFR ( Amer)   52 L 


 


Glucose   77 


 


Lactic Acid  2.3 H  


 


Calcium   6.9 L* 


 


Phosphorus   1.9 L 


 


Magnesium   1.6 








                                        





10/02/20 14:10   Catheterized Urine   Urine Culture - Final


                            NO GROWTH 2 DAYS








Impressions: 


                                        





Chest X-Ray  10/04/20 08:36


IMPRESSION:  LEFT PLEURAL EFFUSION IS SLIGHTLY INCREASED AND THERE IS ALSO 

INCREASING DENSITY IN THE RIGHT LUNG BASE.


 














Assessment & Plan





- Diagnosis


(1) Lung cancer


Qualifiers: 


   Laterality: left   Lung location: lower lobe of lung   Qualified Code(s): 

C34.32 - Malignant neoplasm of lower lobe, left bronchus or lung   


Is this a current diagnosis for this admission?: Yes   


Plan: 


Comfort care measures started, start morphine IV today








(2) UTI (urinary tract infection)


Qualifiers: 


   Urinary tract infection type: catheter-associated UTI   Indwelling urinary 

catheter type: indwelling urethral catheter   Encounter type: initial encounter 

 Qualified Code(s): T83.511A - Infection and inflammatory reaction due to 

indwelling urethral catheter, initial encounter; N39.0 - Urinary tract 

infection, site not specified   


Is this a current diagnosis for this admission?: Yes   


Plan: 


Stop atbx








(3) Dehydration


Is this a current diagnosis for this admission?: Yes   


Plan: 


Stop IVF








- Time


Time Spent with patient: 35 or more minutes

## 2020-10-04 NOTE — RADIOLOGY REPORT (SQ)
EXAM DESCRIPTION:  CHEST SINGLE VIEW



IMAGES COMPLETED DATE/TIME:  10/4/2020 9:40 am



REASON FOR STUDY:  increase lung sound



COMPARISON:  10/2/2020.



EXAM PARAMETERS:  NUMBER OF VIEWS: One view.

TECHNIQUE: Single frontal radiographic view of the chest acquired.

RADIATION DOSE: NA

LIMITATIONS: None.



FINDINGS:  LUNGS AND PLEURA: Elevated left hemidiaphragm.  Density in the left lung base.  Left pleur
al effusion, slightly increased.  Right basilar airspace disease.

MEDIASTINUM AND HILAR STRUCTURES: No masses.  Contour normal.

HEART AND VASCULAR STRUCTURES: Heart normal in size.  Normal vasculature.

BONES: No acute findings.

HARDWARE: Vascular port.  Surgical clips.

OTHER: No other significant finding.



IMPRESSION:  LEFT PLEURAL EFFUSION IS SLIGHTLY INCREASED AND THERE IS ALSO INCREASING DENSITY IN THE 
RIGHT LUNG BASE.



TECHNICAL DOCUMENTATION:  JOB ID:  [id number]

 2011 [employer]- All Rights Reserved



Reading location - IP/workstation name: [email or web address]

## 2020-10-04 NOTE — PDOC PROGRESS REPORT
Subjective


Progress Note for:: 10/04/20


Subjective:: 





Patient's condition took a downward trend this morning with more difficulty with

breathing and worsening chest x ray suggestive of increase pleural effusion and 

right lower lobe opacity. This is related to recurrent aspiration. Family has 

agreed to make patient comfort care at this time.


Reason For Visit: 


PROBABLE SEPSIS,ACUTE KIDNEY INJURY,HYPOKALEMIA,SE








Physical Exam


Vital Signs: 


                                        











Temp Pulse Resp BP Pulse Ox


 


 97.6 F   95   30 H  140/58 H  82 L


 


 10/04/20 10:00  10/04/20 09:37  10/04/20 09:37  10/04/20 00:20  10/04/20 09:37








                                 Intake & Output











 10/03/20 10/04/20 10/05/20





 06:59 06:59 06:59


 


Intake Total 1100 2389 1150


 


Output Total 100 265 


 


Balance 1000 2124 1150


 


Weight 37.8 kg  











Physical Exam: 





General appearance: PRESENT: Grunting breath sound and moribund.  Remain on 

supplemental oxygen.


Respiratory exam: PRESENT: scattered crackle, decreased breath sounds  


Cardiovascular exam: PRESENT: RRR, +S1, +S2.  ABSENT: rubs


Neurological exam: PRESENT: Lethargic


Skin exam: PRESENT: dry, warm





Results


Laboratory Results: 


                                        





                                 10/04/20 06:00 





                                 10/04/20 06:00 





                                        











  10/03/20 10/03/20 10/03/20





  06:10 18:20 18:36


 


WBC   13.6 H 


 


RBC   2.68 L 


 


Hgb   8.0 L 


 


Hct   24.2 L 


 


MCV   90 


 


MCH   29.8 


 


MCHC   33.1 


 


RDW   16.8 H 


 


Plt Count   125 L 


 


Seg Neutrophils %   73.5 


 


Sodium    138.4


 


Potassium    4.5  D


 


Chloride    115 H


 


Carbon Dioxide    18 L


 


Anion Gap    5


 


BUN    44 H


 


Creatinine    1.38 H


 


Est GFR ( Amer)    45 L


 


Glucose    116 H


 


Lactic Acid   


 


Calcium    7.1 L


 


Phosphorus   


 


Magnesium  1.8  














  10/03/20 10/04/20 10/04/20





  18:36 06:00 06:00


 


WBC    11.5 H


 


RBC    2.78 L


 


Hgb    8.4 L


 


Hct    25.2 L


 


MCV    91


 


MCH    30.1


 


MCHC    33.1


 


RDW    17.0 H


 


Plt Count    104 L


 


Seg Neutrophils %    77.4


 


Sodium   140.3 


 


Potassium   4.3 


 


Chloride   116 H 


 


Carbon Dioxide   17 L 


 


Anion Gap   7 


 


BUN   43 H 


 


Creatinine   1.22 


 


Est GFR ( Amer)   52 L 


 


Glucose   77 


 


Lactic Acid  2.3 H  


 


Calcium   6.9 L* 


 


Phosphorus   1.9 L 


 


Magnesium   1.6 








                                        





10/02/20 14:10   Catheterized Urine   Urine Culture - Final


                            NO GROWTH 2 DAYS








Impressions: 


                                        





Chest X-Ray  10/04/20 08:36


IMPRESSION:  LEFT PLEURAL EFFUSION IS SLIGHTLY INCREASED AND THERE IS ALSO 

INCREASING DENSITY IN THE RIGHT LUNG BASE.


 














Assessment & Plan





- Diagnosis


(1) Probable sepsis


Is this a current diagnosis for this admission?: Yes   





(2) Acute renal injury due to hypovolemia


Is this a current diagnosis for this admission?: Yes   





(3) Hypokalemia due to inadequate potassium intake


Is this a current diagnosis for this admission?: Yes   





(4) Seizure disorder


Is this a current diagnosis for this admission?: Yes   





(5) Lung cancer metastatic to brain


Is this a current diagnosis for this admission?: Yes   





(6) History of chronic lymphocytic leukemia


Is this a current diagnosis for this admission?: Yes   





(7) Malnutrition


Qualifiers: 


   Malnutrition type: protein-calorie malnutrition   Protein-calorie 

malnutrition severity: moderate   Qualified Code(s): E44.0 - Moderate protein-

calorie malnutrition   


Is this a current diagnosis for this admission?: Yes   





(8) Aspiration pneumonia


Qualifiers: 


   Aspiration pneumonia type: unspecified   Laterality: right   Lung location: l

ower lobe of lung   Qualified Code(s): J69.0 - Pneumonitis due to inhalation of 

food and vomit   


Is this a current diagnosis for this admission?: Yes   





- Time


Time Spent with patient: 25-34 minutes


Level of Care: MEDICAL


Medications reviewed and adjusted accordingly: Yes


Anticipated discharge: Home


Anticipated DC Timeframe: within 72 hours





- Inpatient Certification


Based on my medical assessment, after consideration of the patient's 

comorbidities, presenting symptoms, or acuity I expect that the services needed 

warrant INPATIENT care.: Yes


I certify that my determination is in accordance with my understanding of 

Medicare's requirements for reasonable and necessary INPATIENT services [42 CFR 

412.3e].: Yes


Medical Necessity: Significant Comorbidiites Make Outpatient Treatment Too 

Risky, Need Close Monitoring Due to Risk of Patient Decompensation, Need for 

Nebulizer Therapy and Monitoring of Response, Need for Pain Control, Risk of 

Complication if Not Cared For in Hospital, Risk of Diagnosis Which Will Require 

Inpatient Eval/Care/Monitoring


Post Hospital Care: D/C Planner Documentation





- Plan Summary


Plan Summary: 





Patient will remain on comfort care. I had extensive discussion with son at 

bedside. Overall prognosis remain poor due to advance age and morbidities.

## 2020-10-05 NOTE — DEATH SUMMARY
Death Summary


Date : 10/04/20


Autopsy: No


Resuscitation Status: Comfort Measures Only





- Final Diagnosis


(1) Probable sepsis


Is this a current diagnosis for this admission?: Yes   





(2) Acute renal injury due to hypovolemia


Is this a current diagnosis for this admission?: Yes   





(3) Hypokalemia due to inadequate potassium intake


Is this a current diagnosis for this admission?: Yes   





(4) Seizure disorder


Is this a current diagnosis for this admission?: Yes   





(5) Lung cancer metastatic to brain


Is this a current diagnosis for this admission?: Yes   





(6) History of chronic lymphocytic leukemia


Is this a current diagnosis for this admission?: Yes   





(7) Malnutrition


Is this a current diagnosis for this admission?: Yes   





(8) Aspiration pneumonia


Is this a current diagnosis for this admission?: Yes   


Hospital Course:: 





Patient was admitted for change in mental status post exposure to scopolamine 

patch. Her initial evaluation suggested possible sepsis with acute kidney injury

from dehydration. She was treated with IV fluid support and IV antibiotic. Her 

stay was further complicated with recurrent aspiration and worsening right lower

lobe opacification suggestive of aspiration pneumonia. Her admitting DNR status 

was changed to comfort care at family request due to her overall poor prognosis 

and advance morbidities including metastatic lung cancer with brain involvement 

and CLL. She was pronounced dead at about 22:02 Hour on 10/04/2020. Her death 

certificate will be handled by Dr. Snyder, her medical oncologist.